# Patient Record
Sex: MALE | Race: WHITE | Employment: OTHER | URBAN - METROPOLITAN AREA
[De-identification: names, ages, dates, MRNs, and addresses within clinical notes are randomized per-mention and may not be internally consistent; named-entity substitution may affect disease eponyms.]

---

## 2019-03-12 ENCOUNTER — HOSPITAL ENCOUNTER (OUTPATIENT)
Dept: VASCULAR SURGERY | Age: 79
Discharge: HOME | End: 2019-03-12
Attending: FAMILY MEDICINE
Payer: MEDICARE

## 2019-03-12 DIAGNOSIS — I73.9 PAD (PERIPHERAL ARTERY DISEASE) (HCC): ICD-10-CM

## 2019-03-12 LAB
LEFT ABI: 1.45
LEFT ANTERIOR TIBIAL: 188 MMHG
LEFT ARM BP: 141 MMHG
LEFT CALF PRESSURE: 213 MMHG
LEFT POSTERIOR TIBIAL: 212 MMHG
RIGHT ABI: 1.36
RIGHT ANTERIOR TIBIAL: 196 MMHG
RIGHT ARM BP: 146 MMHG
RIGHT CALF PRESSURE: 202 MMHG
RIGHT POSTERIOR TIBIAL: 198 MMHG

## 2019-03-12 PROCEDURE — 93923 UPR/LXTR ART STDY 3+ LVLS: CPT

## 2020-01-11 ENCOUNTER — HOSPITAL ENCOUNTER (EMERGENCY)
Age: 80
Discharge: HOME | End: 2020-01-12
Attending: EMERGENCY MEDICINE
Payer: MEDICARE

## 2020-01-11 ENCOUNTER — APPOINTMENT (OUTPATIENT)
Dept: GENERAL RADIOLOGY | Age: 80
End: 2020-01-11
Attending: EMERGENCY MEDICINE
Payer: MEDICARE

## 2020-01-11 VITALS
HEIGHT: 71 IN | BODY MASS INDEX: 30.1 KG/M2 | OXYGEN SATURATION: 96 % | TEMPERATURE: 98.7 F | SYSTOLIC BLOOD PRESSURE: 131 MMHG | DIASTOLIC BLOOD PRESSURE: 71 MMHG | WEIGHT: 215 LBS | HEART RATE: 81 BPM | RESPIRATION RATE: 13 BRPM

## 2020-01-11 DIAGNOSIS — R53.83 FATIGUE, UNSPECIFIED TYPE: Primary | ICD-10-CM

## 2020-01-11 LAB
ALBUMIN SERPL-MCNC: 2.9 G/DL (ref 3.4–5)
ALBUMIN/GLOB SERPL: 0.9 {RATIO} (ref 0.8–1.7)
ALP SERPL-CCNC: 50 U/L (ref 45–117)
ALT SERPL-CCNC: 24 U/L (ref 16–61)
ANION GAP SERPL CALC-SCNC: 7 MMOL/L (ref 3–18)
APPEARANCE UR: CLEAR
AST SERPL-CCNC: 14 U/L (ref 10–38)
BASOPHILS # BLD: 0 K/UL (ref 0–0.1)
BASOPHILS NFR BLD: 0 % (ref 0–2)
BILIRUB SERPL-MCNC: 0.4 MG/DL (ref 0.2–1)
BILIRUB UR QL: NEGATIVE
BUN SERPL-MCNC: 25 MG/DL (ref 7–18)
BUN/CREAT SERPL: 25 (ref 12–20)
CALCIUM SERPL-MCNC: 8.6 MG/DL (ref 8.5–10.1)
CHLORIDE SERPL-SCNC: 106 MMOL/L (ref 100–111)
CK MB CFR SERPL CALC: ABNORMAL % (ref 0–4)
CK MB SERPL-MCNC: <1 NG/ML (ref 5–25)
CK SERPL-CCNC: 38 U/L (ref 39–308)
CO2 SERPL-SCNC: 28 MMOL/L (ref 21–32)
COLOR UR: YELLOW
CREAT SERPL-MCNC: 1.02 MG/DL (ref 0.6–1.3)
DIFFERENTIAL METHOD BLD: ABNORMAL
EOSINOPHIL # BLD: 0.2 K/UL (ref 0–0.4)
EOSINOPHIL NFR BLD: 3 % (ref 0–5)
ERYTHROCYTE [DISTWIDTH] IN BLOOD BY AUTOMATED COUNT: 13.7 % (ref 11.6–14.5)
GLOBULIN SER CALC-MCNC: 3.3 G/DL (ref 2–4)
GLUCOSE SERPL-MCNC: 147 MG/DL (ref 74–99)
GLUCOSE UR STRIP.AUTO-MCNC: NEGATIVE MG/DL
HCT VFR BLD AUTO: 31.7 % (ref 36–48)
HGB BLD-MCNC: 10.3 G/DL (ref 13–16)
HGB UR QL STRIP: NEGATIVE
KETONES UR QL STRIP.AUTO: NEGATIVE MG/DL
LEUKOCYTE ESTERASE UR QL STRIP.AUTO: NEGATIVE
LYMPHOCYTES # BLD: 1.7 K/UL (ref 0.9–3.6)
LYMPHOCYTES NFR BLD: 28 % (ref 21–52)
MCH RBC QN AUTO: 29.2 PG (ref 24–34)
MCHC RBC AUTO-ENTMCNC: 32.5 G/DL (ref 31–37)
MCV RBC AUTO: 89.8 FL (ref 74–97)
MONOCYTES # BLD: 0.8 K/UL (ref 0.05–1.2)
MONOCYTES NFR BLD: 13 % (ref 3–10)
NEUTS SEG # BLD: 3.4 K/UL (ref 1.8–8)
NEUTS SEG NFR BLD: 56 % (ref 40–73)
NITRITE UR QL STRIP.AUTO: NEGATIVE
PH UR STRIP: 6 [PH] (ref 5–8)
PLATELET # BLD AUTO: 170 K/UL (ref 135–420)
PMV BLD AUTO: 10.9 FL (ref 9.2–11.8)
POTASSIUM SERPL-SCNC: 3.4 MMOL/L (ref 3.5–5.5)
PROT SERPL-MCNC: 6.2 G/DL (ref 6.4–8.2)
PROT UR STRIP-MCNC: NEGATIVE MG/DL
RBC # BLD AUTO: 3.53 M/UL (ref 4.7–5.5)
SODIUM SERPL-SCNC: 141 MMOL/L (ref 136–145)
SP GR UR REFRACTOMETRY: 1.01 (ref 1–1.03)
TROPONIN I SERPL-MCNC: 0.05 NG/ML (ref 0–0.04)
UROBILINOGEN UR QL STRIP.AUTO: 1 EU/DL (ref 0.2–1)
WBC # BLD AUTO: 6.1 K/UL (ref 4.6–13.2)

## 2020-01-11 PROCEDURE — 71045 X-RAY EXAM CHEST 1 VIEW: CPT

## 2020-01-11 PROCEDURE — 99285 EMERGENCY DEPT VISIT HI MDM: CPT

## 2020-01-11 PROCEDURE — 93005 ELECTROCARDIOGRAM TRACING: CPT

## 2020-01-11 PROCEDURE — 80053 COMPREHEN METABOLIC PANEL: CPT

## 2020-01-11 PROCEDURE — 85025 COMPLETE CBC W/AUTO DIFF WBC: CPT

## 2020-01-11 PROCEDURE — 81003 URINALYSIS AUTO W/O SCOPE: CPT

## 2020-01-11 PROCEDURE — 82550 ASSAY OF CK (CPK): CPT

## 2020-01-12 LAB
ATRIAL RATE: 75 BPM
CALCULATED P AXIS, ECG09: -12 DEGREES
CALCULATED R AXIS, ECG10: -30 DEGREES
CALCULATED T AXIS, ECG11: 6 DEGREES
DIAGNOSIS, 93000: NORMAL
P-R INTERVAL, ECG05: 196 MS
Q-T INTERVAL, ECG07: 390 MS
QRS DURATION, ECG06: 102 MS
QTC CALCULATION (BEZET), ECG08: 435 MS
VENTRICULAR RATE, ECG03: 75 BPM

## 2020-01-12 PROCEDURE — 74011250636 HC RX REV CODE- 250/636: Performed by: EMERGENCY MEDICINE

## 2020-01-12 RX ADMIN — SODIUM CHLORIDE 1000 ML: 900 INJECTION, SOLUTION INTRAVENOUS at 00:09

## 2020-01-12 NOTE — ED TRIAGE NOTES
Patient presents to the ED via EMS for evaluation on generalized weakness and tachycardia. Per medic, \"He was discharged from a hospital in [facility] yesterday. He was admitted for one night because of a nose bleed and tachycardia. Since being discharged, family has noticed he has been weak and fatigued all day and want him to be checked out. Patient states he was instructed at discharge to not take his metoprolol or aspirin. \"    Patient tom any chest pain, N/V, double vision or blurry vision

## 2020-01-12 NOTE — DISCHARGE INSTRUCTIONS

## 2020-01-12 NOTE — ED PROVIDER NOTES
EMERGENCY DEPARTMENT HISTORY AND PHYSICAL EXAM    11:43 PM      Date: 1/11/2020  Patient Name: Minh Pimentel    History of Presenting Illness     Chief Complaint   Patient presents with    Fatigue         History Provided By: Patient, Patient's Wife, Patient's Son and Patient's Daughter    Additional History (Context): Minh Pimentel is a 78 y.o. male with diabetes, hypertension and hyperlipidemia who presents with [place]. Patient recently had a nosebleed. Patient had a [employer] for couple of days. He was also diagnosed with SVT. He was discharged home went home and developed dizziness and fatigue. Patient denies chest pain. He has felt nauseated. He denies vomiting or diarrhea. He denies cough. His appetite has been normal.  He denies any urinary symptoms including blood or painful urination. He denies any smoking, alcohol or recreational drug use. PCP: Angélica Nunez MD          Past History     Past Medical History:  History reviewed. No pertinent past medical history. Past Surgical History:  History reviewed. No pertinent surgical history. Family History:  History reviewed. No pertinent family history. Social History:  Social History     Tobacco Use    Smoking status: Never Smoker    Smokeless tobacco: Never Used   Substance Use Topics    Alcohol use: Not on file    Drug use: Not on file       Allergies:  No Known Allergies      Review of Systems       Review of Systems   Constitutional: Positive for activity change. Negative for appetite change, chills, diaphoresis and fever. HENT: Negative. Negative for congestion, rhinorrhea and sore throat. Eyes: Negative. Negative for pain, discharge and redness. Respiratory: Positive for shortness of breath. Negative for cough, chest tightness and wheezing. Cardiovascular: Negative. Negative for chest pain. Gastrointestinal: Positive for nausea. Negative for abdominal pain, constipation, diarrhea and vomiting. Genitourinary: Negative. Negative for dysuria, flank pain, frequency, hematuria and urgency. Musculoskeletal: Negative. Negative for back pain and neck pain. Skin: Negative. Negative for rash. Neurological: Positive for dizziness and light-headedness. Negative for syncope, weakness, numbness and headaches. Psychiatric/Behavioral: Negative. All other systems reviewed and are negative. Physical Exam     Visit Vitals  /71   Pulse 81   Temp 98.7 °F (37.1 °C)   Resp 13   Ht 5' 11\" (1.803 m)   Wt 97.5 kg (215 lb)   SpO2 96%   BMI 29.99 kg/m²         Physical Exam  Vitals signs and nursing note reviewed. Constitutional:       General: He is not in acute distress. Appearance: Normal appearance. He is well-developed. He is not ill-appearing, toxic-appearing or diaphoretic. HENT:      Head: Normocephalic and atraumatic. Mouth/Throat:      Pharynx: No oropharyngeal exudate. Eyes:      General: No scleral icterus. Conjunctiva/sclera: Conjunctivae normal.      Pupils: Pupils are equal, round, and reactive to light. Neck:      Musculoskeletal: Normal range of motion and neck supple. Thyroid: No thyromegaly. Vascular: No hepatojugular reflux or JVD. Trachea: No tracheal deviation. Cardiovascular:      Rate and Rhythm: Regular rhythm. Tachycardia present. Pulses: Normal pulses. Radial pulses are 2+ on the right side and 2+ on the left side. Dorsalis pedis pulses are 2+ on the right side and 2+ on the left side. Heart sounds: Normal heart sounds, S1 normal and S2 normal. No murmur. No gallop. No S3 or S4 sounds. Pulmonary:      Effort: Pulmonary effort is normal. No respiratory distress. Breath sounds: Normal breath sounds. No decreased breath sounds, wheezing, rhonchi or rales. Abdominal:      General: Bowel sounds are normal. There is no distension. Palpations: Abdomen is soft. Abdomen is not rigid. There is no mass. Tenderness: There is no tenderness. There is no guarding or rebound. Negative signs include Pierce's sign and McBurney's sign. Musculoskeletal: Normal range of motion. Comments: Strength is 4-5 throughout. Lymphadenopathy:      Head:      Right side of head: No submental, submandibular, preauricular or occipital adenopathy. Left side of head: No submental, submandibular, preauricular or occipital adenopathy. Cervical: No cervical adenopathy. Upper Body:      Right upper body: No supraclavicular adenopathy. Left upper body: No supraclavicular adenopathy. Skin:     General: Skin is warm and dry. Findings: No rash. Neurological:      Mental Status: He is alert. He is not disoriented. GCS: GCS eye subscore is 4. GCS verbal subscore is 5. GCS motor subscore is 6. Cranial Nerves: No cranial nerve deficit. Sensory: No sensory deficit. Coordination: Coordination normal.      Gait: Gait normal.      Deep Tendon Reflexes: Reflexes are normal and symmetric. Comments: Grossly intact. Psychiatric:         Speech: Speech normal.         Behavior: Behavior normal.         Thought Content:  Thought content normal.         Judgment: Judgment normal.           Diagnostic Study Results     Labs -  Recent Results (from the past 12 hour(s))   EKG, 12 LEAD, INITIAL    Collection Time: 01/11/20 10:12 PM   Result Value Ref Range    Ventricular Rate 75 BPM    Atrial Rate 75 BPM    P-R Interval 196 ms    QRS Duration 102 ms    Q-T Interval 390 ms    QTC Calculation (Bezet) 435 ms    Calculated P Axis -12 degrees    Calculated R Axis -30 degrees    Calculated T Axis 6 degrees    Diagnosis       Normal sinus rhythm  Left axis deviation  Incomplete right bundle branch block  Abnormal ECG  No previous ECGs available     CARDIAC PANEL,(CK, CKMB & TROPONIN)    Collection Time: 01/11/20 10:30 PM   Result Value Ref Range    CK 38 (L) 39 - 308 U/L    CK - MB <1.0 <3.6 ng/ml    CK-MB Index  0.0 - 4.0 %     CALCULATION NOT PERFORMED WHEN RESULT IS BELOW LINEAR LIMIT    Troponin-I, QT 0.05 (H) 0.0 - 0.045 NG/ML   CBC WITH AUTOMATED DIFF    Collection Time: 01/11/20 10:30 PM   Result Value Ref Range    WBC 6.1 4.6 - 13.2 K/uL    RBC 3.53 (L) 4.70 - 5.50 M/uL    HGB 10.3 (L) 13.0 - 16.0 g/dL    HCT 31.7 (L) 36.0 - 48.0 %    MCV 89.8 74.0 - 97.0 FL    MCH 29.2 24.0 - 34.0 PG    MCHC 32.5 31.0 - 37.0 g/dL    RDW 13.7 11.6 - 14.5 %    PLATELET 181 255 - 965 K/uL    MPV 10.9 9.2 - 11.8 FL    NEUTROPHILS 56 40 - 73 %    LYMPHOCYTES 28 21 - 52 %    MONOCYTES 13 (H) 3 - 10 %    EOSINOPHILS 3 0 - 5 %    BASOPHILS 0 0 - 2 %    ABS. NEUTROPHILS 3.4 1.8 - 8.0 K/UL    ABS. LYMPHOCYTES 1.7 0.9 - 3.6 K/UL    ABS. MONOCYTES 0.8 0.05 - 1.2 K/UL    ABS. EOSINOPHILS 0.2 0.0 - 0.4 K/UL    ABS. BASOPHILS 0.0 0.0 - 0.1 K/UL    DF AUTOMATED     METABOLIC PANEL, COMPREHENSIVE    Collection Time: 01/11/20 10:30 PM   Result Value Ref Range    Sodium 141 136 - 145 mmol/L    Potassium 3.4 (L) 3.5 - 5.5 mmol/L    Chloride 106 100 - 111 mmol/L    CO2 28 21 - 32 mmol/L    Anion gap 7 3.0 - 18 mmol/L    Glucose 147 (H) 74 - 99 mg/dL    BUN 25 (H) 7.0 - 18 MG/DL    Creatinine 1.02 0.6 - 1.3 MG/DL    BUN/Creatinine ratio 25 (H) 12 - 20      GFR est AA >60 >60 ml/min/1.73m2    GFR est non-AA >60 >60 ml/min/1.73m2    Calcium 8.6 8.5 - 10.1 MG/DL    Bilirubin, total 0.4 0.2 - 1.0 MG/DL    ALT (SGPT) 24 16 - 61 U/L    AST (SGOT) 14 10 - 38 U/L    Alk.  phosphatase 50 45 - 117 U/L    Protein, total 6.2 (L) 6.4 - 8.2 g/dL    Albumin 2.9 (L) 3.4 - 5.0 g/dL    Globulin 3.3 2.0 - 4.0 g/dL    A-G Ratio 0.9 0.8 - 1.7     URINALYSIS W/ RFLX MICROSCOPIC    Collection Time: 01/11/20 11:00 PM   Result Value Ref Range    Color YELLOW      Appearance CLEAR      Specific gravity 1.011 1.005 - 1.030      pH (UA) 6.0 5.0 - 8.0      Protein NEGATIVE  NEG mg/dL    Glucose NEGATIVE  NEG mg/dL    Ketone NEGATIVE  NEG mg/dL    Bilirubin NEGATIVE  NEG Blood NEGATIVE  NEG      Urobilinogen 1.0 0.2 - 1.0 EU/dL    Nitrites NEGATIVE  NEG      Leukocyte Esterase NEGATIVE  NEG         Radiologic Studies -   XR CHEST PORT   Final Result   IMPRESSION:      Negative chest.               Medical Decision Making   Provider Notes (Medical Decision Making):  MDM  Number of Diagnoses or Management Options  Fatigue, unspecified type:   Diagnosis management comments: Shortness of breath etiologies include chronic obstructive pulmonary disease (COPD), acute asthma exacerbation, congestive heart failure, pneumonia, acute bronchitis, pulmonary embolism, upper respiratory infection, cardiac event to include acute coronary syndrome, acute myocardial infarction or a combination of the above (ex URI on top of COPD thus causing respiratory distress). Patient is an 49-year-old  male with some recent blood loss secondary to nosebleed. He is not on blood thinners. Was diagnosed with SVT. Complaining now of some fatigue. Will order cardiac enzymes, UA, x-ray and lab work. I am the first provider for this patient. I reviewed the vital signs, available nursing notes, past medical history, past surgical history, family history and social history. Vital Signs-Reviewed the patient's vital signs. Records Reviewed: Nursing Notes (Time of Review: 11:43 PM)    ED Course: Progress Notes, Reevaluation, and Consults:    Labs essentially normal with the exception of hemoglobin 10.3. Chest X-Ray showed No acute process. EKG showed NSR with rate of 75 bpm. With no ST elevations or depression and non specific T wave changes. 11:43 PM 1/11/2020    When examined patient's heart rate was in the 130s. When he sat up he self-Valsalva and heart rate went down to the 80s. He remained there the entire time. Diagnosis       I have reassessed the patient. Patient is feeling well. Patient was discharged in stable condition.   Patient is to return to emergency department if any new or worsening condition. Clinical Impression:   1. Fatigue, unspecified type        Disposition: Discharged home     Follow-up Information     Follow up With Specialties Details Why Israel Garnett., MD Family Practice In 2 days  [address]  [address] [facility] [address] [phone]               Attestation        Provider Attestation:     I personally performed the services described in the documentation, reviewed the documentation and it accurately and completely records my words and actions utilizing the [address] [place] January 12, 2020 at 12:37 AM - Elroy Vallecillo DO    Disclaimer. It is dictated using utilizing voice recognition software. Unfortunately this leads to occasional typographical errors. I apologize in advance if the situation occurs. If questions arise please do not hesitate to contact me or call our department.

## 2020-01-14 ENCOUNTER — OFFICE VISIT (OUTPATIENT)
Dept: CARDIOLOGY CLINIC | Age: 80
End: 2020-01-14

## 2020-01-14 VITALS
HEART RATE: 65 BPM | SYSTOLIC BLOOD PRESSURE: 124 MMHG | HEIGHT: 71 IN | OXYGEN SATURATION: 95 % | RESPIRATION RATE: 18 BRPM | WEIGHT: 221 LBS | DIASTOLIC BLOOD PRESSURE: 76 MMHG | BODY MASS INDEX: 30.94 KG/M2

## 2020-01-14 DIAGNOSIS — I47.1 SVT (SUPRAVENTRICULAR TACHYCARDIA) (HCC): Primary | ICD-10-CM

## 2020-01-14 RX ORDER — CEPHALEXIN 500 MG/1
500 CAPSULE ORAL
COMMUNITY
Start: 2020-01-08 | End: 2020-01-18

## 2020-01-14 RX ORDER — METOPROLOL TARTRATE 25 MG/1
12.5 TABLET, FILM COATED ORAL 2 TIMES DAILY
Qty: 1 TAB | Refills: 0
Start: 2020-01-14 | End: 2020-02-26 | Stop reason: SDUPTHER

## 2020-01-14 RX ORDER — LISINOPRIL AND HYDROCHLOROTHIAZIDE 12.5; 2 MG/1; MG/1
TABLET ORAL 2 TIMES DAILY
COMMUNITY
End: 2020-01-15 | Stop reason: ALTCHOICE

## 2020-01-14 RX ORDER — GLIMEPIRIDE 1 MG/1
1 TABLET ORAL
COMMUNITY
Start: 2019-12-02 | End: 2021-01-19

## 2020-01-14 RX ORDER — ASPIRIN 81 MG/1
TABLET ORAL DAILY
COMMUNITY
End: 2020-01-15

## 2020-01-14 RX ORDER — VERAPAMIL HYDROCHLORIDE 240 MG/1
240 TABLET, FILM COATED, EXTENDED RELEASE ORAL DAILY
Qty: 1 TAB | Refills: 0
Start: 2020-01-14 | End: 2021-01-19

## 2020-01-14 RX ORDER — VERAPAMIL HYDROCHLORIDE 240 MG/1
240 TABLET, FILM COATED, EXTENDED RELEASE ORAL
COMMUNITY
End: 2020-01-14

## 2020-01-14 RX ORDER — ATORVASTATIN CALCIUM 40 MG/1
40 TABLET, FILM COATED ORAL
COMMUNITY
Start: 2019-11-18

## 2020-01-14 RX ORDER — METOPROLOL TARTRATE 25 MG/1
25 TABLET, FILM COATED ORAL 2 TIMES DAILY
COMMUNITY
Start: 2020-01-09 | End: 2020-01-14

## 2020-01-14 NOTE — PROGRESS NOTES
Bri Ayala is a 78 y.o. male presents in office for    Chief Complaint   Patient presents with   [facility] Follow Up     [facility] 1/8/2020 r/t epistaxis; [address] 1/11/2020 r/t fatigue    Wheezing     x2 days    Cough     at night x2 days        Visit Vitals  /76 (BP 1 Location: Left arm, BP Patient Position: Sitting)   Pulse 65   Resp 18   Ht 5' 11\" (1.803 m)   Wt 100.2 kg (221 lb)   SpO2 95%   BMI 30.82 kg/m²         Health Maintenance Due   Topic Date Due    DTaP/Tdap/Td series (1 - Tdap) 02/[birth year]/1949    Shingrix Vaccine Age 50> (1 of 2) 02/07/1988    GLAUCOMA SCREENING Q2Y  02/07/2003    Pneumococcal 65+ years (1 of 1 - PPSV23) 02/07/2003    MEDICARE YEARLY EXAM  03/20/2018    Influenza Age 9 to Adult  08/01/2019             1. Have you been to the ER, urgent care clinic since your last visit? Hospitalized since your last visit? [facility] 1/9/2020 r/t Epistaxis; [address] 1/11/2020 r/t Fatigue    2. Have you seen or consulted any other health care providers outside of the [address] [place] [address] since your last visit? Include any pap smears or colon screening. No    3 most recent PHQ Screens 1/14/2020   Little interest or pleasure in doing things Not at all   Feeling down, depressed, irritable, or hopeless Not at all   Total Score PHQ 2 0       Abuse Screening Questionnaire 1/14/2020   Do you ever feel afraid of your partner? N   Are you in a relationship with someone who physically or mentally threatens you? N   Is it safe for you to go home? Y       Fall Risk Assessment, last 12 mths 1/14/2020   Able to walk? Yes   Fall in past 12 months?  No       Learning Assessment 1/14/2020   PRIMARY LEARNER Patient   HIGHEST LEVEL OF EDUCATION - PRIMARY LEARNER  > 4 YEARS OF COLLEGE   BARRIERS PRIMARY LEARNER NONE   CO-LEARNER CAREGIVER Yes   CO-LEARNER NAME Juan Nuñez Mary-Russell   PRIMARY LANGUAGE ENGLISH   LEARNER PREFERENCE PRIMARY DEMONSTRATION   ANSWERED BY patient RELATIONSHIP SELF

## 2020-01-15 ENCOUNTER — OFFICE VISIT (OUTPATIENT)
Dept: CARDIOLOGY CLINIC | Age: 80
End: 2020-01-15

## 2020-01-15 VITALS
RESPIRATION RATE: 18 BRPM | HEART RATE: 59 BPM | WEIGHT: 221 LBS | DIASTOLIC BLOOD PRESSURE: 64 MMHG | BODY MASS INDEX: 30.94 KG/M2 | SYSTOLIC BLOOD PRESSURE: 122 MMHG | HEIGHT: 71 IN | OXYGEN SATURATION: 96 %

## 2020-01-15 DIAGNOSIS — I47.1 SVT (SUPRAVENTRICULAR TACHYCARDIA) (HCC): Primary | ICD-10-CM

## 2020-01-15 RX ORDER — LISINOPRIL 20 MG/1
20 TABLET ORAL DAILY
Qty: 30 TAB | Refills: 3 | Status: SHIPPED | OUTPATIENT
Start: 2020-01-15

## 2020-01-15 NOTE — PROGRESS NOTES
Trang Ponce is a 78 y.o. male presents in office for    Chief Complaint   Patient presents with    New Patient    SVT     Diagnosed at [facility]        Visit Vitals  /64 (BP 1 Location: Left arm, BP Patient Position: Sitting)   Pulse (!) 59   Resp 18   Ht 5' 11\" (1.803 m)   Wt 100.2 kg (221 lb)   SpO2 96%   BMI 30.82 kg/m²         Health Maintenance Due   Topic Date Due    DTaP/Tdap/Td series (1 - Tdap) 02/07/1949    Shingrix Vaccine Age 50> (1 of 2) 02/07/1988    GLAUCOMA SCREENING Q2Y  02/07/2003    Pneumococcal 65+ years (1 of 1 - PPSV23) 02/07/2003    MEDICARE YEARLY EXAM  03/20/2018    Influenza Age 9 to Adult  08/01/2019             1. Have you been to the ER, urgent care clinic since your last visit? Hospitalized since your last visit? [facility] 1/8/20 r/t epistaxis; [facility] 1/11/20 r/t fatigue    2. Have you seen or consulted any other health care providers outside of the [address] [place] [address] since your last visit? Include any pap smears or colon screening. [facility]    3 most recent [address] 1/14/2020   Little interest or pleasure in doing things Not at all   Feeling down, depressed, irritable, or hopeless Not at all   Total Score PHQ 2 0       Abuse Screening Questionnaire 1/14/2020   Do you ever feel afraid of your partner? N   Are you in a relationship with someone who physically or mentally threatens you? N   Is it safe for you to go home? Y       Fall Risk Assessment, last 12 mths 1/14/2020   Able to walk? Yes   Fall in past 12 months?  No       Learning Assessment 1/14/2020   PRIMARY LEARNER Patient   HIGHEST LEVEL OF EDUCATION - PRIMARY LEARNER  > 4 YEARS OF COLLEGE   BARRIERS PRIMARY LEARNER NONE   CO-LEARNER CAREGIVER Yes   CO-LEARNER NAME Juan Nuñez Mary-Russell   PRIMARY LANGUAGE ENGLISH   LEARNER PREFERENCE PRIMARY DEMONSTRATION   ANSWERED BY patient   RELATIONSHIP SELF

## 2020-01-15 NOTE — PROGRESS NOTES
Gearline Cordial    Chief Complaint   Patient presents with    New Patient    SVT     Diagnosed at [facility]       HPI    Reynold Frias is a 78 y.o. very pleasant gentleman, here with his wife and 2 children for ER follow up of SVT. As you know, this gentleman has no previous known heart disease. Earlier this month, he had a perfuse nose bleed and was taken to the hospital while in the [facility]. His SBP was 180-200s, and found in SVT 140s. He was given Atropine x3 (per family) and didn't convert until given IV Lopressor. He has been on ACEI + HCTZ plus Verapamil for HTN control for many years. He was added Metoprolol and sent home. He saw his PCP and his BP was lower 90s so he was asked to stop BB and later that day at home went back into SVT 140s. He was then taken to [facility] ER but by the time there, HR back to normal. I personally obtained and reviewed these records. They come today with several questions. He has ENT followup, Hb last 10. He does have DM2. No exertional symptoms, no jo CP, no syncope. Past Medical History:   Diagnosis Date    SVT (supraventricular tachycardia) (HCC)        Past Surgical History:   Procedure Laterality Date    HX APPENDECTOMY      HX HERNIA REPAIR      HX TONSIL AND ADENOIDECTOMY         Current Outpatient Medications   Medication Sig Dispense Refill    lisinopril (PRINIVIL, ZESTRIL) 20 mg tablet Take 1 Tab by mouth daily. 30 Tab 3    atorvastatin (LIPITOR) 40 mg tablet Take 40 mg by mouth.  cephALEXin (KEFLEX) 500 mg capsule Take 500 mg by mouth.  glimepiride (AMARYL) 1 mg tablet Take 1 mg by mouth.  verapamil ER (CALAN-SR) 240 mg CR tablet Take 1 Tab by mouth daily. In the AM and 1/2 tab in the PM 1 Tab 0    metoprolol tartrate (LOPRESSOR) 25 mg tablet Take 0.5 Tabs by mouth two (2) times a day.  1 Tab 0       No Known Allergies    Social History     Socioeconomic History    Marital status:      Spouse name: Not on file  Number of children: Not on file    Years of education: Not on file    Highest education level: Not on file   Occupational History    Not on file   Social Needs    Financial resource strain: Not on file    Food insecurity:     Worry: Not on file     Inability: Not on file    Transportation needs:     Medical: Not on file     Non-medical: Not on file   Tobacco Use    Smoking status: Never Smoker    Smokeless tobacco: Never Used   Substance and Sexual Activity    Alcohol use: Not Currently    Drug use: Never    Sexual activity: Not Currently   Lifestyle    Physical activity:     Days per week: Not on file     Minutes per session: Not on file    Stress: Not on file   Relationships    Social connections:     Talks on phone: Not on file     Gets together: Not on file     Attends Orthodoxy service: Not on file     Active member of club or organization: Not on file     Attends meetings of clubs or organizations: Not on file     Relationship status: Not on file    Intimate partner violence:     Fear of current or ex partner: Not on file     Emotionally abused: Not on file     Physically abused: Not on file     Forced sexual activity: Not on file   Other Topics Concern    Not on file   Social History Narrative    Not on file        FH: n/a    Review of Systems    14 pt Review of Systems is negative unless otherwise mentioned in the HPI.     Wt Readings from Last 3 Encounters:   01/15/20 100.2 kg (221 lb)   01/14/20 100.2 kg (221 lb)   01/11/20 97.5 kg (215 lb)     Temp Readings from Last 3 Encounters:   01/11/20 98.7 °F (37.1 °C)     BP Readings from Last 3 Encounters:   01/15/20 122/64   01/14/20 124/76   01/11/20 131/71     Pulse Readings from Last 3 Encounters:   01/15/20 (!) 59   01/14/20 65   01/11/20 81       Physical Exam:    Visit Vitals  /64 (BP 1 Location: Left arm, BP Patient Position: Sitting)   Pulse (!) 59   Resp 18   Ht 5' 11\" (1.803 m)   Wt 100.2 kg (221 lb)   SpO2 96%   BMI 30.82 kg/m²      Physical Exam  HENT:      Head: Normocephalic and atraumatic. Eyes:      General: No scleral icterus. Pupils: Pupils are equal, round, and reactive to light. Cardiovascular:      Rate and Rhythm: Normal rate and regular rhythm. Heart sounds: Normal heart sounds. No murmur. No friction rub. No gallop. Pulmonary:      Effort: Pulmonary effort is normal. No respiratory distress. Breath sounds: Normal breath sounds. No wheezing or rales. Chest:      Chest wall: No tenderness. Abdominal:      General: Bowel sounds are normal.      Palpations: Abdomen is soft. Skin:     General: Skin is warm and dry. Findings: No rash. Neurological:      Mental Status: He is alert and oriented to person, place, and time. EKG today shows: NSR, normal axis and intervals, no ST segment abnormalities    Lab Results   Component Value Date/Time    Cholesterol, total 152 10/06/2010 02:00 PM    HDL Cholesterol 53 10/06/2010 02:00 PM    LDL, calculated 80 10/06/2010 02:00 PM    VLDL, calculated 19 10/06/2010 02:00 PM    Triglyceride 95 10/06/2010 02:00 PM    CHOL/HDL Ratio 2.9 10/06/2010 02:00 PM       Impression and Plan:  Dena Khanna is a 78 y.o. with:    1.) Paroxsymal SVT, not responsive to Atropine (likely AT)  2.) Recent acute blood loss anemia from severe epistaxis  3.) DM2    1.) Agree stop HCTZ today  2.) Lisinopril 20 mg daily  3.) Continue Verapamil as taking  4.) Continue Metoprolol Tartrate 12.5 BID for rate control  5.) SBP parameters given, ideally 100-140s, take meds after eating  6.) Not taking ASA currently, which is fine for now  7.) RTC with NP ~4-8 weeks close recheck to see if tolerating BB  8.) RTC with me 6 months    Overall likely AT, triggered by anemia/ dehydration which is responsive to low dose BB  >60 mins spent reviewing records, answering questions.   They were concerned about the +trop which is expected in SVT  Doubt ACS as he has no other exertional symptoms  Would not pursue ablation as typically not that symptomatic and its likely AT which has a lower surgical cure rate than say AVNRT  Wife also asked about anxiety, which I kindly defer to PCP but did not sound out of proportion or inappropriate    Thank you for allowing me to participate in the care of your patient, please do not hesitate to call with questions or concerns.     Kindest Regards,    Vida Suarez, DO

## 2020-02-26 ENCOUNTER — OFFICE VISIT (OUTPATIENT)
Dept: CARDIOLOGY CLINIC | Age: 80
End: 2020-02-26

## 2020-02-26 VITALS
SYSTOLIC BLOOD PRESSURE: 128 MMHG | HEIGHT: 71 IN | DIASTOLIC BLOOD PRESSURE: 72 MMHG | HEART RATE: 71 BPM | WEIGHT: 222 LBS | BODY MASS INDEX: 31.08 KG/M2 | OXYGEN SATURATION: 97 %

## 2020-02-26 DIAGNOSIS — I47.1 SVT (SUPRAVENTRICULAR TACHYCARDIA) (HCC): Primary | ICD-10-CM

## 2020-02-26 DIAGNOSIS — I10 ESSENTIAL HYPERTENSION: ICD-10-CM

## 2020-02-26 RX ORDER — METOPROLOL TARTRATE 25 MG/1
12.5 TABLET, FILM COATED ORAL 2 TIMES DAILY
Qty: 180 TAB | Refills: 3 | Status: SHIPPED | OUTPATIENT
Start: 2020-02-26 | End: 2021-01-19 | Stop reason: SDUPTHER

## 2020-02-26 NOTE — PROGRESS NOTES
Eneida Ross    Chief Complaint   Patient presents with    Follow-up       HPI    Eneida Ross is a 78 y.o. very pleasant gentleman, here with his wife and 2 children for ER follow up of SVT. As you know, this gentleman has no previous known heart disease. Earlier this month, he had a perfuse nose bleed and was taken to the hospital while in the [facility]. His SBP was 180-200s, and found in SVT 140s. He was given Atropine x3 (per family) and didn't convert until given IV Lopressor. He has been on ACEI + HCTZ plus Verapamil for HTN control for many years. He was added Metoprolol and sent home. He saw his PCP and his BP was lower 90s so he was asked to stop BB and later that day at home went back into SVT 140s. He was then taken to [facility] ER but by the time there, HR back to normal. I personally obtained and reviewed these records. They come today with several questions. He has ENT followup, Hb last 10. He does have DM2. No exertional symptoms, no jo CP, no syncope. Past Medical History:   Diagnosis Date    SVT (supraventricular tachycardia) (HCC)        Past Surgical History:   Procedure Laterality Date    HX APPENDECTOMY      HX HERNIA REPAIR      HX TONSIL AND ADENOIDECTOMY         Current Outpatient Medications   Medication Sig Dispense Refill    lisinopril (PRINIVIL, ZESTRIL) 20 mg tablet Take 1 Tab by mouth daily. 30 Tab 3    atorvastatin (LIPITOR) 40 mg tablet Take 40 mg by mouth.  glimepiride (AMARYL) 1 mg tablet Take 1 mg by mouth.  verapamil ER (CALAN-SR) 240 mg CR tablet Take 1 Tab by mouth daily. In the AM and 1/2 tab in the PM 1 Tab 0    metoprolol tartrate (LOPRESSOR) 25 mg tablet Take 0.5 Tabs by mouth two (2) times a day.  1 Tab 0       No Known Allergies    Social History     Socioeconomic History    Marital status:      Spouse name: Not on file    Number of children: Not on file    Years of education: Not on file    Highest education level: Not on file Occupational History    Not on file   Social Needs    Financial resource strain: Not on file    Food insecurity:     Worry: Not on file     Inability: Not on file    Transportation needs:     Medical: Not on file     Non-medical: Not on file   Tobacco Use    Smoking status: Never Smoker    Smokeless tobacco: Never Used   Substance and Sexual Activity    Alcohol use: Not Currently    Drug use: Never    Sexual activity: Not Currently   Lifestyle    Physical activity:     Days per week: Not on file     Minutes per session: Not on file    Stress: Not on file   Relationships    Social connections:     Talks on phone: Not on file     Gets together: Not on file     Attends Taoism service: Not on file     Active member of club or organization: Not on file     Attends meetings of clubs or organizations: Not on file     Relationship status: Not on file    Intimate partner violence:     Fear of current or ex partner: Not on file     Emotionally abused: Not on file     Physically abused: Not on file     Forced sexual activity: Not on file   Other Topics Concern    Not on file   Social History Narrative    Not on file        FH: n/a    Review of Systems    14 pt Review of Systems is negative unless otherwise mentioned in the HPI. Wt Readings from Last 3 Encounters:   02/26/20 100.7 kg (222 lb)   01/15/20 100.2 kg (221 lb)   01/14/20 100.2 kg (221 lb)     Temp Readings from Last 3 Encounters:   01/11/20 98.7 °F (37.1 °C)     BP Readings from Last 3 Encounters:   02/26/20 128/72   01/15/20 122/64   01/14/20 124/76     Pulse Readings from Last 3 Encounters:   02/26/20 71   01/15/20 (!) 59   01/14/20 65       Physical Exam:    Visit Vitals  /72 (BP 1 Location: Left arm, BP Patient Position: Sitting)   Pulse 71   Ht 5' 11\" (1.803 m)   Wt 100.7 kg (222 lb)   SpO2 97%   BMI 30.96 kg/m²      Physical Exam  HENT:      Head: Normocephalic and atraumatic. Eyes:      General: No scleral icterus.      Pupils: Pupils are equal, round, and reactive to light. Cardiovascular:      Rate and Rhythm: Normal rate and regular rhythm. Heart sounds: Normal heart sounds. No murmur. No friction rub. No gallop. Pulmonary:      Effort: Pulmonary effort is normal. No respiratory distress. Breath sounds: Normal breath sounds. No wheezing or rales. Chest:      Chest wall: No tenderness. Abdominal:      General: Bowel sounds are normal.      Palpations: Abdomen is soft. Skin:     General: Skin is warm and dry. Findings: No rash. Neurological:      Mental Status: He is alert and oriented to person, place, and time. EKG today shows: NSR, normal axis and intervals, no ST segment abnormalities    Lab Results   Component Value Date/Time    Cholesterol, total 152 10/06/2010 02:00 PM    HDL Cholesterol 53 10/06/2010 02:00 PM    LDL, calculated 80 10/06/2010 02:00 PM    VLDL, calculated 19 10/06/2010 02:00 PM    Triglyceride 95 10/06/2010 02:00 PM    CHOL/HDL Ratio 2.9 10/06/2010 02:00 PM       Impression and Plan:  Radha Hernandez is a 78 y.o. with:    1.) Paroxsymal SVT, not responsive to Atropine (likely AT)  2.) Recent acute blood loss anemia from severe epistaxis  3.) DM2    1.) Agree stop HCTZ today  2.) Lisinopril 20 mg daily  3.) Continue Verapamil as taking  4.) Continue Metoprolol Tartrate 12.5 BID for rate control  5.) SBP parameters given, ideally 100-140s, take meds after eating  6.) Not taking ASA currently, which is fine for now  7.) Tolerating above changes well, no further changes today, RTC ~ July as already scheduled, after that if doing well can go to yearly    Overall likely AT, triggered by anemia/ dehydration which is responsive to low dose BB  >60 mins spent reviewing records, answering questions.   They were concerned about the +trop which is expected in SVT  Doubt ACS as he has no other exertional symptoms  Would not pursue ablation as typically not that symptomatic and its likely AT which has a lower surgical cure rate than say AVNRT  Wife also asked about anxiety, which I kindly defer to PCP but did not sound out of proportion or inappropriate  Several additional questions regarding anemia, his complexion etc which again I asked them to dw PCP    Thank you for allowing me to participate in the care of your patient, please do not hesitate to call with questions or concerns.     Kindest Regards,    Vida Suarez, DO

## 2020-02-26 NOTE — PROGRESS NOTES
Mia Hayward presents today for   Chief Complaint   Patient presents with    Follow-up       Mia Hayward preferred language for health care discussion is english/other. Is someone accompanying this pt? yes    Is the patient using any DME equipment during [address]? no    Depression Screening:  3 most recent PHQ Screens 2/26/2020   Little interest or pleasure in doing things Not at all   Feeling down, depressed, irritable, or hopeless Not at all   Total Score PHQ 2 0       Learning Assessment:  Learning Assessment 1/14/2020   PRIMARY LEARNER Patient   HIGHEST LEVEL OF EDUCATION - PRIMARY LEARNER  > 4 YEARS Karen PRIMARY LEARNER NONE   CO-LEARNER CAREGIVER Yes   CO-LEARNER NAME Juan Nuñez Mary-Russell   PRIMARY LANGUAGE ENGLISH   LEARNER PREFERENCE PRIMARY DEMONSTRATION   ANSWERED BY patient   RELATIONSHIP SELF       Abuse Screening:  Abuse Screening Questionnaire 1/14/2020   Do you ever feel afraid of your partner? N   Are you in a relationship with someone who physically or mentally threatens you? N   Is it safe for you to go home? Y       Fall Risk  Fall Risk Assessment, last 12 mths 2/26/2020   Able to walk? Yes   Fall in past 12 months? No       Pt currently taking Anticoagulant therapy? no    Coordination of Care:  1. Have you been to the ER, urgent care clinic since your last visit? Hospitalized since your last visit? no    2. Have you seen or consulted any other health care providers outside of the [facility] since your last visit? Include any pap smears or colon screening.  no

## 2020-07-16 ENCOUNTER — OFFICE VISIT (OUTPATIENT)
Dept: CARDIOLOGY CLINIC | Age: 80
End: 2020-07-16

## 2020-07-16 VITALS
OXYGEN SATURATION: 98 % | DIASTOLIC BLOOD PRESSURE: 82 MMHG | WEIGHT: 217.8 LBS | HEART RATE: 79 BPM | BODY MASS INDEX: 30.38 KG/M2 | SYSTOLIC BLOOD PRESSURE: 140 MMHG

## 2020-07-16 DIAGNOSIS — I10 ESSENTIAL HYPERTENSION: ICD-10-CM

## 2020-07-16 DIAGNOSIS — I47.1 SVT (SUPRAVENTRICULAR TACHYCARDIA) (HCC): Primary | ICD-10-CM

## 2020-07-16 NOTE — PROGRESS NOTES
Ronnie Faye    F/u SVT    HPI    Ronnie Faye is a 78 y.o. very pleasant gentleman, here with his wife and 2 children for ER follow up of SVT. As you know, this gentleman has no previous known heart disease. Earlier this month, he had a perfuse nose bleed and was taken to the hospital while in the [facility]. His SBP was 180-200s, and found in SVT 140s. He was given Atropine x3 (per family) and didn't convert until given IV Lopressor. He has been on ACEI + HCTZ plus Verapamil for HTN control for many years. He was added Metoprolol and sent home. He saw his PCP and his BP was lower 90s so he was asked to stop BB and later that day at home went back into SVT 140s. He was then taken to [facility] ER but by the time there, HR back to normal. I personally obtained and reviewed these records. They come today with several questions. He has ENT followup, Hb last 10. He does have DM2. No exertional symptoms, no jo CP, no syncope. Daughter comes today, pulled nurse aside and gave list of questions- all noncardiac. Wants to know if he should take iron, B12 etc. Concerned about swelling in legs. Pt himself has absolutely no complaints. Past Medical History:   Diagnosis Date    SVT (supraventricular tachycardia) (HCC)        Past Surgical History:   Procedure Laterality Date    HX APPENDECTOMY      HX HERNIA REPAIR      HX TONSIL AND ADENOIDECTOMY         Current Outpatient Medications   Medication Sig Dispense Refill    ferrous sulfate (SLOW FE) 142 mg (45 mg iron) ER tablet Take  by mouth Daily (before breakfast).  metoprolol tartrate (LOPRESSOR) 25 mg tablet Take 0.5 Tabs by mouth two (2) times a day. 180 Tab 3    lisinopril (PRINIVIL, ZESTRIL) 20 mg tablet Take 1 Tab by mouth daily. 30 Tab 3    atorvastatin (LIPITOR) 40 mg tablet Take 40 mg by mouth.  glimepiride (AMARYL) 1 mg tablet Take 1 mg by mouth.  verapamil ER (CALAN-SR) 240 mg CR tablet Take 1 Tab by mouth daily.  In the AM and 1/2 tab in the PM 1 Tab 0       No Known Allergies    Social History     Socioeconomic History    Marital status:      Spouse name: Not on file    Number of children: Not on file    Years of education: Not on file    Highest education level: Not on file   Occupational History    Not on file   Social Needs    Financial resource strain: Not on file    Food insecurity     Worry: Not on file     Inability: Not on file    Transportation needs     Medical: Not on file     Non-medical: Not on file   Tobacco Use    Smoking status: Never Smoker    Smokeless tobacco: Never Used   Substance and Sexual Activity    Alcohol use: Not Currently    Drug use: Never    Sexual activity: Not Currently   Lifestyle    Physical activity     Days per week: Not on file     Minutes per session: Not on file    Stress: Not on file   Relationships    Social connections     Talks on phone: Not on file     Gets together: Not on file     Attends Baptism service: Not on file     Active member of club or organization: Not on file     Attends meetings of clubs or organizations: Not on file     Relationship status: Not on file    Intimate partner violence     Fear of current or ex partner: Not on file     Emotionally abused: Not on file     Physically abused: Not on file     Forced sexual activity: Not on file   Other Topics Concern    Not on file   Social History Narrative    Not on file        FH: n/a    Review of Systems    14 pt Review of Systems is negative unless otherwise mentioned in the HPI.     Wt Readings from Last 3 Encounters:   02/26/20 100.7 kg (222 lb)   01/15/20 100.2 kg (221 lb)   01/14/20 100.2 kg (221 lb)     Temp Readings from Last 3 Encounters:   01/11/20 98.7 °F (37.1 °C)     BP Readings from Last 3 Encounters:   02/26/20 128/72   01/15/20 122/64   01/14/20 124/76     Pulse Readings from Last 3 Encounters:   02/26/20 71   01/15/20 (!) 59   01/14/20 65       Physical Exam:    There were no vitals taken for this visit. Physical Exam  HENT:      Head: Normocephalic and atraumatic. Eyes:      General: No scleral icterus. Pupils: Pupils are equal, round, and reactive to light. Cardiovascular:      Rate and Rhythm: Normal rate and regular rhythm. Heart sounds: Normal heart sounds. No murmur. No friction rub. No gallop. Pulmonary:      Effort: Pulmonary effort is normal. No respiratory distress. Breath sounds: Normal breath sounds. No wheezing or rales. Chest:      Chest wall: No tenderness. Abdominal:      General: Bowel sounds are normal.      Palpations: Abdomen is soft. Skin:     General: Skin is warm and dry. Findings: No rash. Neurological:      Mental Status: He is alert and oriented to person, place, and time. EKG today shows: NSR, normal axis and intervals, no ST segment abnormalities    Lab Results   Component Value Date/Time    Cholesterol, total 152 10/06/2010 02:00 PM    HDL Cholesterol 53 10/06/2010 02:00 PM    LDL, calculated 80 10/06/2010 02:00 PM    VLDL, calculated 19 10/06/2010 02:00 PM    Triglyceride 95 10/06/2010 02:00 PM    CHOL/HDL Ratio 2.9 10/06/2010 02:00 PM     Lab Results   Component Value Date/Time    WBC 6.1 01/11/2020 10:30 PM    HGB 10.3 (L) 01/11/2020 10:30 PM    HCT 31.7 (L) 01/11/2020 10:30 PM    PLATELET 411 37/34/2153 10:30 PM    MCV 89.8 01/11/2020 10:30 PM     Lab Results   Component Value Date/Time    Sodium 141 01/11/2020 10:30 PM    Potassium 3.4 (L) 01/11/2020 10:30 PM    Chloride 106 01/11/2020 10:30 PM    CO2 28 01/11/2020 10:30 PM    Anion gap 7 01/11/2020 10:30 PM    Glucose 147 (H) 01/11/2020 10:30 PM    BUN 25 (H) 01/11/2020 10:30 PM    Creatinine 1.02 01/11/2020 10:30 PM    BUN/Creatinine ratio 25 (H) 01/11/2020 10:30 PM    GFR est AA >60 01/11/2020 10:30 PM    GFR est non-AA >60 01/11/2020 10:30 PM    Calcium 8.6 01/11/2020 10:30 PM    Bilirubin, total 0.4 01/11/2020 10:30 PM    Alk.  phosphatase 50 01/11/2020 10:30 PM Protein, total 6.2 (L) 01/11/2020 10:30 PM    Albumin 2.9 (L) 01/11/2020 10:30 PM    Globulin 3.3 01/11/2020 10:30 PM    A-G Ratio 0.9 01/11/2020 10:30 PM    ALT (SGPT) 24 01/11/2020 10:30 PM    AST (SGOT) 14 01/11/2020 10:30 PM         Impression and Plan:  Josefina Noland is a 78 y.o. with:    1.) Paroxsymal SVT, not responsive to Atropine (likely AT)  2.) Recent acute blood loss anemia from severe epistaxis  3.) DM2  4.) Iron def anemia    1.) stopped HCTZ last time  2.) Lisinopril 20 mg daily  3.) Continue Verapamil as taking  4.) Continue Metoprolol Tartrate 12.5 BID for rate control  5.) SBP parameters given, ideally 100-140s, take meds after eating  6.) Not taking ASA currently, which is fine for now  7.) Tolerating above changes well, no further changes today, RTC ~ July as already scheduled, after that if doing well can go to yearly    Overall likely AT, triggered by anemia/ dehydration which is responsive to low dose BB  >60 mins spent reviewing records, answering questions. They were concerned about the +trop which is expected in SVT  Doubt ACS as he has no other exertional symptoms  Would not pursue ablation as typically not that symptomatic and its likely AT which has a lower surgical cure rate than say AVNRT  Wife also asked about anxiety, which I kindly defer to PCP but did not sound out of proportion or inappropriate  Several additional questions regarding anemia, his complexion etc which again I asked them to dw PCP    Thank you for allowing me to participate in the care of your patient, please do not hesitate to call with questions or concerns.     Kindest Regards,    Jerad Forte, DO

## 2020-07-16 NOTE — Clinical Note
7/16/20 Patient: Ronnie Chamberlain YOB: 1940 Date of Visit: 7/16/2020 Mckinley Sullivan MD 
VIA Dear Mckinley Sullivan MD, Thank you for referring Mr. Ronnie Chamberlain to [address] [place] [address] for evaluation. My notes for this consultation are attached. If you have questions, please do not hesitate to call me. I look forward to following your patient along with you. Sincerely, Ash Elliott, DO

## 2021-01-11 NOTE — PROGRESS NOTES
Yajaira Hull presents today for a 6 month check-up. He was last seen by Dr. Yocasta Dalal on 7/16/20 and during that visit, he appeared to be doing well. He is an 78year old male with history of hypertension, diabetes, SVT, and iron deficiency anemia. He was hospitalized in Jan. 2020, after presenting with epistaxis and was noted to be hypertensive and in SVT with heart rate in the 140's. The SVT responded to use of a beta blocker. He states that he has been feeling okay, a little tired and sleeping a little more. He states that they have noticed low heart rate readings on his blood pressure monitor with heart rates in the 40's and 50's at times. Denies chest pain, tightness, heaviness, and palpitations. Denies shortness of breath at rest, dyspnea on exertion, orthopnea and PND. Denies abdominal bloating. Denies lightheadedness, dizziness, and syncope. Admits to occasional, mild lower extremity edema and denies claudication. Denies nausea, vomiting, diarrhea, melena, hematochezia. Denies hematuria, urgency, frequency. Denies fever, chills. He has been having tooth pain and saw the oral surgeon yesterday. He is tentatively scheduled for a tooth extraction, he reports, sometime in March. PMH:  Past Medical History:   Diagnosis Date    SVT (supraventricular tachycardia) (HCC)        PSH:  Past Surgical History:   Procedure Laterality Date    HX APPENDECTOMY      HX HERNIA REPAIR      HX TONSIL AND ADENOIDECTOMY         MEDS:  Current Outpatient Medications   Medication Sig    metoprolol tartrate (LOPRESSOR) 25 mg tablet Take 0.5 Tabs by mouth two (2) times a day.  verapamil ER (CALAN-SR) 240 mg CR tablet Take 1 Tab by mouth daily.  glimepiride (AMARYL) 1 mg tablet Take 1 Tab by mouth daily.  cholecalciferol, vitamin D3, 50 mcg (2,000 unit) tab Take 1 Tab by mouth daily.  multivitamin (ONE A DAY) tablet Take 1 Tab by mouth daily.     ferrous sulfate (SLOW FE) 142 mg (45 mg iron) ER tablet Take  by mouth Daily (before breakfast).  lisinopril (PRINIVIL, ZESTRIL) 20 mg tablet Take 1 Tab by mouth daily.  atorvastatin (LIPITOR) 40 mg tablet Take 40 mg by mouth. No current facility-administered medications for this visit. Allergies and Sensitivities:  No Known Allergies    Family History:  Family History   Problem Relation Age of Onset    Heart Failure Mother     Diabetes Mother     Cancer Father         Esophageal Cancer       Social History:  He  reports that he has never smoked. He has never used smokeless tobacco.  He  reports previous alcohol use. Physical:  Visit Vitals  BP (!) 170/84 (BP 1 Location: Left arm, BP Patient Position: Sitting)   Pulse (!) 50   Ht 5' 11\" (1.803 m)   Wt 98.5 kg (217 lb 3.2 oz)   SpO2 98%   BMI 30.29 kg/m²         Exam:  Neck:  Supple, no JVD, no carotid bruits  CV:  Normal S1 and  S2, no murmurs, rubs, or gallops noted  Lungs:  Clear to ausculation throughout, no wheezes or rales  Abd:  Soft, non-tender, non-distended with good bowel sounds. No hepatosplenomegaly  Extremities:  Trace lower extremity edema      Data:  EKG:      LABS:  Lab Results   Component Value Date/Time    Sodium 141 01/11/2020 10:30 PM    Potassium 3.4 (L) 01/11/2020 10:30 PM    Chloride 106 01/11/2020 10:30 PM    CO2 28 01/11/2020 10:30 PM    Glucose 147 (H) 01/11/2020 10:30 PM    BUN 25 (H) 01/11/2020 10:30 PM    Creatinine 1.02 01/11/2020 10:30 PM     Lab Results   Component Value Date/Time    Cholesterol, total 152 10/06/2010 02:00 PM    HDL Cholesterol 53 10/06/2010 02:00 PM    LDL, calculated 80 10/06/2010 02:00 PM    Triglyceride 95 10/06/2010 02:00 PM    CHOL/HDL Ratio 2.9 10/06/2010 02:00 PM     Lab Results   Component Value Date/Time    ALT (SGPT) 24 01/11/2020 10:30 PM         Impression/Plan:  1. Essential hypertension, blood pressure elevated and suboptimally controlled  2. History of SVT, stable on beta blocker  3.   Iron deficiency anemia, on iron supplementation  4. Diabetes mellitus, recommend Hgb A1c less than 7% from cardiac standpoint  5. Bradycardia, heart rate 50 bpm today  6. Fatigue/increased somnolence, possibly due to bradycardia    Mr. Billy was seen today for a 6 month check-up. He states that he has been feeling okay but has noticed a little more fatigue and taking more naps. He also has noticed some lower heart rate readings lately. They have noticed heart rates in the 40's and 50's at times. He denies palpitations, lightheadedness, dizziness, pre-syncope, and syncope. I've requested that he wear an event monitor for 7 days to evaluate his overall heart rate. Will discontinue his metoprolol for now. His blood pressure is quite elevated today. He reports having problems with a tooth and tooth pain as well. He took antibiotics. He states that he saw the oral surgeon yesterday and a tooth extraction is tentatively planned for sometime in March. He will be started on hydralazine 10mg twice a day for his blood pressure and he will monitor his blood pressures at home. He has been scheduled to see Dr. Alexandra Boothe in one month for follow-up and for clearance for the tooth extraction. Koby Chávez MSN, FNP-BC    Please note:  Portions of this chart were created with Dragon medical speech to text program.  Unrecognized errors may be present.

## 2021-01-19 ENCOUNTER — OFFICE VISIT (OUTPATIENT)
Dept: CARDIOLOGY CLINIC | Age: 81
End: 2021-01-19
Payer: MEDICARE

## 2021-01-19 VITALS
WEIGHT: 217.2 LBS | DIASTOLIC BLOOD PRESSURE: 80 MMHG | HEIGHT: 71 IN | SYSTOLIC BLOOD PRESSURE: 170 MMHG | OXYGEN SATURATION: 98 % | HEART RATE: 50 BPM | BODY MASS INDEX: 30.41 KG/M2

## 2021-01-19 DIAGNOSIS — R00.1 BRADYCARDIA: ICD-10-CM

## 2021-01-19 DIAGNOSIS — I10 ESSENTIAL HYPERTENSION: ICD-10-CM

## 2021-01-19 DIAGNOSIS — I47.1 SVT (SUPRAVENTRICULAR TACHYCARDIA) (HCC): Primary | ICD-10-CM

## 2021-01-19 DIAGNOSIS — I47.1 SVT (SUPRAVENTRICULAR TACHYCARDIA) (HCC): ICD-10-CM

## 2021-01-19 PROCEDURE — G8427 DOCREV CUR MEDS BY ELIG CLIN: HCPCS | Performed by: NURSE PRACTITIONER

## 2021-01-19 PROCEDURE — G8432 DEP SCR NOT DOC, RNG: HCPCS | Performed by: NURSE PRACTITIONER

## 2021-01-19 PROCEDURE — G8536 NO DOC ELDER MAL SCRN: HCPCS | Performed by: NURSE PRACTITIONER

## 2021-01-19 PROCEDURE — 1101F PT FALLS ASSESS-DOCD LE1/YR: CPT | Performed by: NURSE PRACTITIONER

## 2021-01-19 PROCEDURE — G8417 CALC BMI ABV UP PARAM F/U: HCPCS | Performed by: NURSE PRACTITIONER

## 2021-01-19 PROCEDURE — G8753 SYS BP > OR = 140: HCPCS | Performed by: NURSE PRACTITIONER

## 2021-01-19 PROCEDURE — 99213 OFFICE O/P EST LOW 20 MIN: CPT | Performed by: NURSE PRACTITIONER

## 2021-01-19 PROCEDURE — G8754 DIAS BP LESS 90: HCPCS | Performed by: NURSE PRACTITIONER

## 2021-01-19 RX ORDER — METOPROLOL TARTRATE 25 MG/1
12.5 TABLET, FILM COATED ORAL 2 TIMES DAILY
Qty: 90 TAB | Refills: 3 | Status: SHIPPED | OUTPATIENT
Start: 2021-01-19 | End: 2021-07-30 | Stop reason: SDUPTHER

## 2021-01-19 RX ORDER — VERAPAMIL HYDROCHLORIDE 240 MG/1
240 TABLET, FILM COATED, EXTENDED RELEASE ORAL DAILY
Qty: 1 TAB | Refills: 0
Start: 2021-01-19

## 2021-01-19 RX ORDER — BISMUTH SUBSALICYLATE 262 MG
1 TABLET,CHEWABLE ORAL DAILY
COMMUNITY

## 2021-01-19 RX ORDER — CHOLECALCIFEROL (VITAMIN D3) 125 MCG
CAPSULE ORAL
COMMUNITY
End: 2021-01-19

## 2021-01-19 RX ORDER — CHOLECALCIFEROL (VITAMIN D3) 125 MCG
1 CAPSULE ORAL DAILY
Qty: 1 TAB | Refills: 0
Start: 2021-01-19

## 2021-01-19 RX ORDER — HYDRALAZINE HYDROCHLORIDE 10 MG/1
10 TABLET, FILM COATED ORAL 2 TIMES DAILY
Qty: 60 TAB | Refills: 4 | Status: SHIPPED | OUTPATIENT
Start: 2021-01-19 | End: 2021-04-19

## 2021-01-19 RX ORDER — GLIMEPIRIDE 1 MG/1
1 TABLET ORAL DAILY
Qty: 1 TAB | Refills: 0
Start: 2021-01-19

## 2021-01-19 NOTE — PROGRESS NOTES
Ryder Najera presents today for   Chief Complaint   Patient presents with    Follow-up     6 month        Ryder Najera preferred language for health care discussion is english/other. Is someone accompanying this pt? no    Is the patient using any DME equipment during [address]? no    Depression Screening:  3 most recent PHQ Screens 1/19/2021   Little interest or pleasure in doing things Not at all   Feeling down, depressed, irritable, or hopeless Not at all   Total Score PHQ 2 0       Learning Assessment:  Learning Assessment 1/14/2020   PRIMARY LEARNER Patient   HIGHEST LEVEL OF EDUCATION - PRIMARY LEARNER  > 4 YEARS Karen PRIMARY LEARNER NONE   CO-LEARNER CAREGIVER Yes   CO-LEARNER NAME Juan Nuñez Mary-Russell   PRIMARY LANGUAGE ENGLISH   LEARNER PREFERENCE PRIMARY DEMONSTRATION   ANSWERED BY patient   RELATIONSHIP SELF       Abuse Screening:  Abuse Screening Questionnaire 1/19/2021   Do you ever feel afraid of your partner? N   Are you in a relationship with someone who physically or mentally threatens you? N   Is it safe for you to go home? Y       Fall Risk  Fall Risk Assessment, last 12 mths 1/19/2021   Able to walk? Yes   Fall in past 12 months? 0   Do you feel unsteady? 0   Are you worried about falling 0       Pt currently taking Anticoagulant therapy? no    Coordination of Care:  1. Have you been to the ER, urgent care clinic since your last visit? Hospitalized since your last visit? no    2. Have you seen or consulted any other health care providers outside of the [address] [place] [address] since your last visit? Include any pap smears or colon screening.  no

## 2021-01-19 NOTE — PATIENT INSTRUCTIONS
Begin hydralazine 10mg twice a day (for blood pressure)  Discontinue metoprolol  Event monitor to be worn for 7 days ;  Dx:  Bradycardia, hx of SVT  Follow-up with Dr. Alexandra Boothe as scheduled and as needed

## 2021-02-18 ENCOUNTER — OFFICE VISIT (OUTPATIENT)
Dept: CARDIOLOGY CLINIC | Age: 81
End: 2021-02-18
Payer: MEDICARE

## 2021-02-18 VITALS
HEART RATE: 63 BPM | BODY MASS INDEX: 29.96 KG/M2 | OXYGEN SATURATION: 96 % | DIASTOLIC BLOOD PRESSURE: 80 MMHG | SYSTOLIC BLOOD PRESSURE: 128 MMHG | HEIGHT: 71 IN | WEIGHT: 214 LBS

## 2021-02-18 DIAGNOSIS — I47.1 SVT (SUPRAVENTRICULAR TACHYCARDIA) (HCC): ICD-10-CM

## 2021-02-18 DIAGNOSIS — R00.1 BRADYCARDIA: ICD-10-CM

## 2021-02-18 DIAGNOSIS — I10 ESSENTIAL HYPERTENSION: ICD-10-CM

## 2021-02-18 DIAGNOSIS — Z01.810 PREOP CARDIOVASCULAR EXAM: Primary | ICD-10-CM

## 2021-02-18 PROCEDURE — G8427 DOCREV CUR MEDS BY ELIG CLIN: HCPCS | Performed by: INTERNAL MEDICINE

## 2021-02-18 PROCEDURE — G8417 CALC BMI ABV UP PARAM F/U: HCPCS | Performed by: INTERNAL MEDICINE

## 2021-02-18 PROCEDURE — G8752 SYS BP LESS 140: HCPCS | Performed by: INTERNAL MEDICINE

## 2021-02-18 PROCEDURE — G8754 DIAS BP LESS 90: HCPCS | Performed by: INTERNAL MEDICINE

## 2021-02-18 PROCEDURE — G8536 NO DOC ELDER MAL SCRN: HCPCS | Performed by: INTERNAL MEDICINE

## 2021-02-18 PROCEDURE — G8432 DEP SCR NOT DOC, RNG: HCPCS | Performed by: INTERNAL MEDICINE

## 2021-02-18 PROCEDURE — 99214 OFFICE O/P EST MOD 30 MIN: CPT | Performed by: INTERNAL MEDICINE

## 2021-02-18 PROCEDURE — 1101F PT FALLS ASSESS-DOCD LE1/YR: CPT | Performed by: INTERNAL MEDICINE

## 2021-02-18 NOTE — PROGRESS NOTES
Ryder Najera presents today for   Chief Complaint   Patient presents with    Follow-up     6 month follow up        Ryder Najera preferred language for health care discussion is english/other. Is someone accompanying this pt? no    Is the patient using any DME equipment during [address]? no    Depression Screening:  3 most recent PHQ Screens 1/19/2021   Little interest or pleasure in doing things Not at all   Feeling down, depressed, irritable, or hopeless Not at all   Total Score PHQ 2 0       Learning Assessment:  Learning Assessment 1/14/2020   PRIMARY LEARNER Patient   HIGHEST LEVEL OF EDUCATION - PRIMARY LEARNER  > 4 YEARS Karen PRIMARY LEARNER NONE   CO-LEARNER CAREGIVER Yes   CO-LEARNER NAME Juan Nuñez Mary-Russell   PRIMARY LANGUAGE ENGLISH   LEARNER PREFERENCE PRIMARY DEMONSTRATION   ANSWERED BY patient   RELATIONSHIP SELF       Abuse Screening:  Abuse Screening Questionnaire 1/19/2021   Do you ever feel afraid of your partner? N   Are you in a relationship with someone who physically or mentally threatens you? N   Is it safe for you to go home? Y       Fall Risk  Fall Risk Assessment, last 12 mths 1/19/2021   Able to walk? Yes   Fall in past 12 months? 0   Do you feel unsteady? 0   Are you worried about falling 0       Pt currently taking Anticoagulant therapy? no    Coordination of Care:  1. Have you been to the ER, urgent care clinic since your last visit? Hospitalized since your last visit? no    2. Have you seen or consulted any other health care providers outside of the [address] [place] [address] since your last visit? Include any pap smears or colon screening.  no

## 2021-02-18 NOTE — PROGRESS NOTES
Yessica Hodges    preop    HPI    Yessica Hodges is a 78 y.o. very pleasant gentleman, here with his wife and 2 children for ER follow up of SVT. As you know, this gentleman has no previous known heart disease. Earlier this month, he had a perfuse nose bleed and was taken to the hospital while in the [facility]. His SBP was 180-200s, and found in SVT 140s. He was given Atropine x3 (per family) and didn't convert until given IV Lopressor. He has been on ACEI + HCTZ plus Verapamil for HTN control for many years. He was added Metoprolol and sent home. He saw his PCP and his BP was lower 90s so he was asked to stop BB and later that day at home went back into SVT 140s. He was then taken to [facility] ER but by the time there, HR back to normal. I personally obtained and reviewed these records. They come today with several questions. He has ENT followup, Hb last 10. He does have DM2. No exertional symptoms, no jo CP, no syncope. Daughter comes today, pulled nurse aside and gave list of questions- all noncardiac. Wants to know if he should take iron, B12 etc. Concerned about swelling in legs. Pt himself has absolutely no complaints. Past Medical History:   Diagnosis Date    SVT (supraventricular tachycardia) (HCC)        Past Surgical History:   Procedure Laterality Date    HX APPENDECTOMY      HX HERNIA REPAIR      HX TONSIL AND ADENOIDECTOMY         Current Outpatient Medications   Medication Sig Dispense Refill    multivitamin (ONE A DAY) tablet Take 1 Tab by mouth daily.  metoprolol tartrate (LOPRESSOR) 25 mg tablet Take 0.5 Tabs by mouth two (2) times a day. 90 Tab 3    verapamil ER (CALAN-SR) 240 mg CR tablet Take 1 Tab by mouth daily. 1 Tab 0    glimepiride (AMARYL) 1 mg tablet Take 1 Tab by mouth daily. 1 Tab 0    cholecalciferol, vitamin D3, 50 mcg (2,000 unit) tab Take 1 Tab by mouth daily. 1 Tab 0    hydrALAZINE (APRESOLINE) 10 mg tablet Take 1 Tab by mouth two (2) times a day.  60 Tab 4    ferrous sulfate (SLOW FE) 142 mg (45 mg iron) ER tablet Take  by mouth Daily (before breakfast).  lisinopril (PRINIVIL, ZESTRIL) 20 mg tablet Take 1 Tab by mouth daily. 30 Tab 3    atorvastatin (LIPITOR) 40 mg tablet Take 40 mg by mouth. No Known Allergies    Social History     Socioeconomic History    Marital status:      Spouse name: Not on file    Number of children: Not on file    Years of education: Not on file    Highest education level: Not on file   Occupational History    Not on file   Social Needs    Financial resource strain: Not on file    Food insecurity     Worry: Not on file     Inability: Not on file    Transportation needs     Medical: Not on file     Non-medical: Not on file   Tobacco Use    Smoking status: Never Smoker    Smokeless tobacco: Never Used   Substance and Sexual Activity    Alcohol use: Not Currently    Drug use: Never    Sexual activity: Not Currently   Lifestyle    Physical activity     Days per week: Not on file     Minutes per session: Not on file    Stress: Not on file   Relationships    Social connections     Talks on phone: Not on file     Gets together: Not on file     Attends [employer] service: Not on file     Active member of club or organization: Not on file     Attends meetings of clubs or organizations: Not on file     Relationship status: Not on file    Intimate partner violence     Fear of current or ex partner: Not on file     Emotionally abused: Not on file     Physically abused: Not on file     Forced sexual activity: Not on file   Other Topics Concern    Not on file   Social History Narrative    Not on file        FH: n/a    Review of Systems    14 pt Review of Systems is negative unless otherwise mentioned in the HPI.     Wt Readings from Last 3 Encounters:   02/18/21 97.1 kg (214 lb)   01/19/21 98.5 kg (217 lb 3.2 oz)   07/16/20 98.8 kg (217 lb 12.8 oz)     Temp Readings from Last 3 Encounters:   01/11/20 98.7 °F (37.1 °C)     BP Readings from Last 3 Encounters:   02/18/21 128/80   01/19/21 (!) 170/80   07/16/20 140/82     Pulse Readings from Last 3 Encounters:   02/18/21 63   01/19/21 (!) 50   07/16/20 79       Physical Exam:    Visit Vitals  /80 (BP 1 Location: Left upper arm, BP Patient Position: Sitting, BP Cuff Size: Adult)   Pulse 63   Ht 5' 11\" (1.803 m)   Wt 97.1 kg (214 lb)   SpO2 96%   BMI 29.85 kg/m²      Physical Exam  HENT:      Head: Normocephalic and atraumatic. Eyes:      General: No scleral icterus. Pupils: Pupils are equal, round, and reactive to light. Cardiovascular:      Rate and Rhythm: Normal rate and regular rhythm. Heart sounds: Normal heart sounds. No murmur. No friction rub. No gallop. Pulmonary:      Effort: Pulmonary effort is normal. No respiratory distress. Breath sounds: Normal breath sounds. No wheezing or rales. Chest:      Chest wall: No tenderness. Abdominal:      General: Bowel sounds are normal.      Palpations: Abdomen is soft. Skin:     General: Skin is warm and dry. Findings: No rash. Neurological:      Mental Status: He is alert and oriented to person, place, and time.          EKG today shows: NSR, normal axis and intervals, no ST segment abnormalities    Lab Results   Component Value Date/Time    Cholesterol, total 152 10/06/2010 02:00 PM    HDL Cholesterol 53 10/06/2010 02:00 PM    LDL, calculated 80 10/06/2010 02:00 PM    VLDL, calculated 19 10/06/2010 02:00 PM    Triglyceride 95 10/06/2010 02:00 PM    CHOL/HDL Ratio 2.9 10/06/2010 02:00 PM     Lab Results   Component Value Date/Time    WBC 6.1 01/11/2020 10:30 PM    HGB 10.3 (L) 01/11/2020 10:30 PM    HCT 31.7 (L) 01/11/2020 10:30 PM    PLATELET 636 77/61/4486 10:30 PM    MCV 89.8 01/11/2020 10:30 PM     Lab Results   Component Value Date/Time    Sodium 141 01/11/2020 10:30 PM    Potassium 3.4 (L) 01/11/2020 10:30 PM    Chloride 106 01/11/2020 10:30 PM    CO2 28 01/11/2020 10:30 PM    Anion gap 7 01/11/2020 10:30 PM    Glucose 147 (H) 01/11/2020 10:30 PM    BUN 25 (H) 01/11/2020 10:30 PM    Creatinine 1.02 01/11/2020 10:30 PM    BUN/Creatinine ratio 25 (H) 01/11/2020 10:30 PM    GFR est AA >60 01/11/2020 10:30 PM    GFR est non-AA >60 01/11/2020 10:30 PM    Calcium 8.6 01/11/2020 10:30 PM    Bilirubin, total 0.4 01/11/2020 10:30 PM    Alk. phosphatase 50 01/11/2020 10:30 PM    Protein, total 6.2 (L) 01/11/2020 10:30 PM    Albumin 2.9 (L) 01/11/2020 10:30 PM    Globulin 3.3 01/11/2020 10:30 PM    A-G Ratio 0.9 01/11/2020 10:30 PM    ALT (SGPT) 24 01/11/2020 10:30 PM    AST (SGOT) 14 01/11/2020 10:30 PM         Impression and Plan:  Michoacano Munroe is a 78 y.o. with:    1.) Paroxsymal SVT, not responsive to Atropine (likely AT)  2.) Recent acute blood loss anemia from severe epistaxis  3.) DM2  4.) Iron def anemia  5.)  Perioperative risk stratification prior to tooth extraction    1.) stopped HCTZ last time  2.) Lisinopril 20 mg daily  3.) Continue Verapamil as taking  4.) Continue Metoprolol Tartrate 12.5 BID for rate control  5.) SBP parameters given, ideally 100-140s, take meds after eating  6.) Not taking ASA currently, which is fine for now  7.) Tolerating above changes well, no further changes today, RTC ~ July as already scheduled, after that if doing well can go to yearly    Overall likely AT, triggered by anemia/ dehydration which is responsive to low dose BB    Overall patient is moderate risk from a CV standpoint but can proceed without any further testing. Most of his risk really just come from comorbidities and his age. In addition he is at largest risk to have recurrence of his SVT just from potential pain or stress prior to the procedure. I think even more so continue the metoprolol to help prevent this from happening. I did review interval notes and I see that my nurse practitioner asked him to hold his beta-blocker due to some intermittent bradycardia just a few weeks ago.   It does not sound like he did this and he still taking the metoprolol and tells me he really like to continue taking it. His heart rate is normal today he has [place] no complaints which she almost never does the told him it is okay to go ahead and continue. He has any further questions or needs refills or can go home and check but who accompanies him today was not sure. Thank you for allowing me to participate in the care of your patient, please do not hesitate to call with questions or concerns.     Kindest Regards,    Carolin Hurd, DO

## 2021-02-18 NOTE — LETTER
2/18/2021 2:48 PM 
 
Mr. Crystal Meng [address] [id number] Crystal Meng was seen in our office on 2/18/2021 for cardiac evaluation. From a cardiac standpoint he is moderate risk for Tooth extraction, may proceed without further testing has history of svt which he is on mediations for. Please feel free to contact our office if you have any questions regarding this patient. Sincerely, Jairon Rush, DO

## 2021-07-30 ENCOUNTER — OFFICE VISIT (OUTPATIENT)
Dept: CARDIOLOGY CLINIC | Age: 81
End: 2021-07-30
Payer: MEDICARE

## 2021-07-30 VITALS
WEIGHT: 213 LBS | HEART RATE: 56 BPM | BODY MASS INDEX: 29.71 KG/M2 | SYSTOLIC BLOOD PRESSURE: 134 MMHG | DIASTOLIC BLOOD PRESSURE: 82 MMHG | OXYGEN SATURATION: 96 %

## 2021-07-30 DIAGNOSIS — I47.1 SVT (SUPRAVENTRICULAR TACHYCARDIA) (HCC): Primary | ICD-10-CM

## 2021-07-30 PROCEDURE — G8754 DIAS BP LESS 90: HCPCS | Performed by: INTERNAL MEDICINE

## 2021-07-30 PROCEDURE — 1101F PT FALLS ASSESS-DOCD LE1/YR: CPT | Performed by: INTERNAL MEDICINE

## 2021-07-30 PROCEDURE — G8536 NO DOC ELDER MAL SCRN: HCPCS | Performed by: INTERNAL MEDICINE

## 2021-07-30 PROCEDURE — G8427 DOCREV CUR MEDS BY ELIG CLIN: HCPCS | Performed by: INTERNAL MEDICINE

## 2021-07-30 PROCEDURE — G8752 SYS BP LESS 140: HCPCS | Performed by: INTERNAL MEDICINE

## 2021-07-30 PROCEDURE — G8417 CALC BMI ABV UP PARAM F/U: HCPCS | Performed by: INTERNAL MEDICINE

## 2021-07-30 PROCEDURE — 93000 ELECTROCARDIOGRAM COMPLETE: CPT | Performed by: INTERNAL MEDICINE

## 2021-07-30 PROCEDURE — 99215 OFFICE O/P EST HI 40 MIN: CPT | Performed by: INTERNAL MEDICINE

## 2021-07-30 PROCEDURE — G8432 DEP SCR NOT DOC, RNG: HCPCS | Performed by: INTERNAL MEDICINE

## 2021-07-30 RX ORDER — METOPROLOL TARTRATE 25 MG/1
12.5 TABLET, FILM COATED ORAL 2 TIMES DAILY
Qty: 90 TABLET | Refills: 3 | Status: SHIPPED | OUTPATIENT
Start: 2021-07-30

## 2022-06-23 ENCOUNTER — TELEPHONE (OUTPATIENT)
Dept: CARDIOLOGY CLINIC | Age: 82
End: 2022-06-23

## 2022-06-23 NOTE — TELEPHONE ENCOUNTER
Pts wife called in regards he has been having some swelling in the legs and ankles. Pts wife sts he is taking the HCTZ. Asked if Pt was having  BP increase or Chest pain, dizziness. Pts wife states he isn't having any of those symptoms. Pts wife states he doesn't feel good. Asked who put him on the HCTZ per Dr Nba Del Valle note he was taken off of the HCTZ at the last visit and told to follow up with the PCP Dr Kiki Flores. Educated Pts wife on medications given by Dr Nba Del Valle metoprolol, hydralazine, verapamil and lisinopril. Pts wife asked me to repeat meds and advise them to follow up with PCP today. Pts wife verbalized understanding.

## 2022-06-29 ENCOUNTER — TELEPHONE (OUTPATIENT)
Dept: CARDIOLOGY CLINIC | Age: 82
End: 2022-06-29

## 2022-06-29 NOTE — TELEPHONE ENCOUNTER
Spoke with patient wife sts they went to PCP in regards to leg swelling. Sts they were told to make appointment with Dr Fozia Huang. Advise Pt I would S/W Dr Kate Rosales. Review with Dr Fozia Huang about his heart function is normal and none of the medications that he is on are causing the swelling. If he is having problems with lesion on his legs he may need to be seen at the urgent care. Patients wife states he had blood work done by his PCP and she said he needed to F/U with cardiology. Reassured her that per his last office visit with Dr Thomas [place] are not cardiac related and he will need to see a vascular specialist. Since he has lesions on his legs an Urgent care would be able to see him. Patient wife sts he will not go and the PCP said that they are healing. Patients wife states she doesn't know what to do. Asked her to have the PCP send over labs to Dr Fozia Huang and request to have him seen by a vascular specialist.  Patient wife sts she will call the PCP tomorrow.

## 2022-07-01 ENCOUNTER — TRANSCRIBE ORDER (OUTPATIENT)
Dept: SCHEDULING | Age: 82
End: 2022-07-01

## 2022-07-01 DIAGNOSIS — R00.1 BRADYCARDIA: Primary | ICD-10-CM

## 2022-07-02 ENCOUNTER — TRANSCRIBE ORDER (OUTPATIENT)
Dept: SCHEDULING | Age: 82
End: 2022-07-02

## 2022-07-08 ENCOUNTER — TELEPHONE (OUTPATIENT)
Dept: CARDIOLOGY CLINIC | Age: 82
End: 2022-07-08

## 2022-07-08 NOTE — TELEPHONE ENCOUNTER
S/W Ravi Garber from [address] [place] [address] patient came in 7/07/22 with C/O bradycardia HR in lower 50's and 1 wk ago bilateral leg swelling. Richardson Buerger she informed him that he will need to stop the metoprolol but he will not unless Dr Lindsay Chew says so. Sts she put the patient on furosemide 20mg and it has helped with the swelling. He doesn't need a vascular study done at this time she believes he need to come off the of Metoprolol. She did get him to agree to stop taking at least one of the metoprolol. Advise Matteo Beard I would let Dr Lindsay Chew know. Spoke with Dr Lindsay Chew. Ok to have patient stop metoprolol and monitor HR. If it increases then we will put him back on it.

## 2022-07-11 NOTE — TELEPHONE ENCOUNTER
Called patient wife in regards to HR. Advise her to stop the Metoprolol and monitor the HR. If his HR increases then we will have to put him back on the medication. Wife asked which reading in the HR on the pulse ox. Educated wife on which one is O2 and pulse rate on monitor. Patients wife verbalized understanding.

## 2022-12-07 ENCOUNTER — OFFICE VISIT (OUTPATIENT)
Dept: CARDIOLOGY CLINIC | Age: 82
End: 2022-12-07
Payer: MEDICARE

## 2022-12-07 VITALS
OXYGEN SATURATION: 96 % | SYSTOLIC BLOOD PRESSURE: 138 MMHG | HEART RATE: 61 BPM | WEIGHT: 214.2 LBS | DIASTOLIC BLOOD PRESSURE: 76 MMHG | BODY MASS INDEX: 29.87 KG/M2

## 2022-12-07 DIAGNOSIS — I10 ESSENTIAL HYPERTENSION: Primary | ICD-10-CM

## 2022-12-07 DIAGNOSIS — I47.1 SVT (SUPRAVENTRICULAR TACHYCARDIA) (HCC): ICD-10-CM

## 2022-12-07 PROCEDURE — 3074F SYST BP LT 130 MM HG: CPT | Performed by: INTERNAL MEDICINE

## 2022-12-07 PROCEDURE — 99215 OFFICE O/P EST HI 40 MIN: CPT | Performed by: INTERNAL MEDICINE

## 2022-12-07 PROCEDURE — G8536 NO DOC ELDER MAL SCRN: HCPCS | Performed by: INTERNAL MEDICINE

## 2022-12-07 PROCEDURE — 93000 ELECTROCARDIOGRAM COMPLETE: CPT | Performed by: INTERNAL MEDICINE

## 2022-12-07 PROCEDURE — 3078F DIAST BP <80 MM HG: CPT | Performed by: INTERNAL MEDICINE

## 2022-12-07 PROCEDURE — G8417 CALC BMI ABV UP PARAM F/U: HCPCS | Performed by: INTERNAL MEDICINE

## 2022-12-07 PROCEDURE — 1101F PT FALLS ASSESS-DOCD LE1/YR: CPT | Performed by: INTERNAL MEDICINE

## 2022-12-07 PROCEDURE — G8432 DEP SCR NOT DOC, RNG: HCPCS | Performed by: INTERNAL MEDICINE

## 2022-12-07 PROCEDURE — G8427 DOCREV CUR MEDS BY ELIG CLIN: HCPCS | Performed by: INTERNAL MEDICINE

## 2022-12-07 PROCEDURE — 1123F ACP DISCUSS/DSCN MKR DOCD: CPT | Performed by: INTERNAL MEDICINE

## 2022-12-07 NOTE — LETTER
12/7/2022    Patient: Jemal Justice   YOB: 1940   Date of Visit: 12/7/2022     Tejinder Gutierres NP  [address] [place] [id number]  Via Fax: [phone]    Dear Tejinder Gutierres NP,      Thank you for referring Mr. Jemal Justice to [address] [place] [address] for evaluation. My notes for this consultation are attached. If you have questions, please do not hesitate to call me. I look forward to following your patient along with you.       Sincerely,    Erick Gómez, DO

## 2022-12-07 NOTE — PROGRESS NOTES
Bruna Nieves    Fatigue, SVT    HPI    Bruna Nieves is a 78 y.o. very pleasant gentleman, who I met in 2020 for SVT. He had a perfuse nose bleed and was taken to the hospital while in the [facility]. His SBP was 180-200s, and found in SVT 140s. He was given Atropine x3 (per family) and didn't convert until given IV Lopressor. He has been on ACEI + HCTZ plus Verapamil for HTN control for many years. He was added Metoprolol and sent home. He saw his PCP and his BP was lower 90s so he was asked to stop BB and later that day at home went back into SVT 140s. He was then taken to [facility] ER but by the time there, HR back to normal. I personally obtained and reviewed these records. They come today with several questions. As usual, patient himself has no complaints but his wife and son have several about him. 1.) He's excessively tired 2.) Son questioning Lisinopril because he feels this makes him tired 3.) Says HR lower, has called also in the past, but unclear if pt feels poorly at these times 4.) c/o persistent LE edema, 5.) Toe \"dead\"    Aware he mentioned above to PCP- had monitor ordered but wife says \"oh no we cant do that. \" They found it too bulky and complex so never did it. She and her son want him to MCT that is sleeker (so he can still entertain their guests coming from out of town), pt reluctant. Past Medical History:   Diagnosis Date    SVT (supraventricular tachycardia) (HCC)        Past Surgical History:   Procedure Laterality Date    HX APPENDECTOMY      HX HERNIA REPAIR      HX TONSIL AND ADENOIDECTOMY         Current Outpatient Medications   Medication Sig Dispense Refill    metoprolol tartrate (LOPRESSOR) 25 mg tablet Take 0.5 Tablets by mouth two (2) times a day. 90 Tablet 3    multivitamin (ONE A DAY) tablet Take 1 Tab by mouth daily. verapamil ER (CALAN-SR) 240 mg CR tablet Take 1 Tab by mouth daily. 1 Tab 0    glimepiride (AMARYL) 1 mg tablet Take 1 Tab by mouth daily.  1 Tab 0 lisinopril (PRINIVIL, ZESTRIL) 20 mg tablet Take 1 Tab by mouth daily. 30 Tab 3    atorvastatin (LIPITOR) 40 mg tablet Take 40 mg by mouth. hydrALAZINE (APRESOLINE) 10 mg tablet TAKE 1 TABLET BY MOUTH TWICE DAILY (Patient not taking: Reported on 12/7/2022) 180 Tab 3    cholecalciferol, vitamin D3, 50 mcg (2,000 unit) tab Take 1 Tab by mouth daily. (Patient not taking: Reported on 12/7/2022) 1 Tab 0       No Known Allergies    Social History     Socioeconomic History    Marital status:      Spouse name: Not on file    Number of children: Not on file    Years of education: Not on file    Highest education level: Not on file   Occupational History    Not on file   Tobacco Use    Smoking status: Never    Smokeless tobacco: Never   Substance and Sexual Activity    Alcohol use: Not Currently    Drug use: Never    Sexual activity: Not Currently   Other Topics Concern    Not on file   Social History Narrative    Not on file     Social Determinants of Health     Financial Resource Strain: Not on file   Food Insecurity: Not on file   Transportation Needs: Not on file   Physical Activity: Not on file   Stress: Not on file   Social Connections: Not on file   Intimate Partner Violence: Not on file   Housing Stability: Not on file        FH: n/a    Review of Systems    14 pt Review of Systems is negative unless otherwise mentioned in the HPI.     Wt Readings from Last 3 Encounters:   12/07/22 97.2 kg (214 lb 3.2 oz)   07/30/21 96.6 kg (213 lb)   02/18/21 97.1 kg (214 lb)     Temp Readings from Last 3 Encounters:   01/11/20 98.7 °F (37.1 °C)     BP Readings from Last 3 Encounters:   12/07/22 138/76   07/30/21 134/82   02/18/21 128/80     Pulse Readings from Last 3 Encounters:   12/07/22 61   07/30/21 (!) 56   02/18/21 63       Physical Exam:    Visit Vitals  /76 (BP 1 Location: Left upper arm, BP Patient Position: Sitting)   Pulse 61   Wt 97.2 kg (214 lb 3.2 oz)   SpO2 96%   BMI 29.87 kg/m²      Physical Exam  HENT:      Head: Normocephalic and atraumatic. Eyes:      General: No scleral icterus. Pupils: Pupils are equal, round, and reactive to light. Cardiovascular:      Rate and Rhythm: Normal rate and regular rhythm. Heart sounds: Normal heart sounds. No murmur heard. No friction rub. No gallop. Pulmonary:      Effort: Pulmonary effort is normal. No respiratory distress. Breath sounds: Normal breath sounds. No wheezing or rales. Chest:      Chest wall: No tenderness. Abdominal:      General: Bowel sounds are normal.      Palpations: Abdomen is soft. Musculoskeletal:      Right lower leg: Edema present. Left lower leg: Edema present. Skin:     General: Skin is warm and dry. Findings: No rash. Neurological:      Mental Status: He is alert and oriented to person, place, and time.        EKG today shows: NSR, normal axis and intervals, no ST segment abnormalities    Lab Results   Component Value Date/Time    Cholesterol, total 152 10/06/2010 02:00 PM    HDL Cholesterol 53 10/06/2010 02:00 PM    LDL, calculated 80 10/06/2010 02:00 PM    VLDL, calculated 19 10/06/2010 02:00 PM    Triglyceride 95 10/06/2010 02:00 PM    CHOL/HDL Ratio 2.9 10/06/2010 02:00 PM     Lab Results   Component Value Date/Time    WBC 6.1 01/11/2020 10:30 PM    HGB 10.3 (L) 01/11/2020 10:30 PM    HCT 31.7 (L) 01/11/2020 10:30 PM    PLATELET 937 67/20/4563 10:30 PM    MCV 89.8 01/11/2020 10:30 PM     Lab Results   Component Value Date/Time    Sodium 141 01/11/2020 10:30 PM    Potassium 3.4 (L) 01/11/2020 10:30 PM    Chloride 106 01/11/2020 10:30 PM    CO2 28 01/11/2020 10:30 PM    Anion gap 7 01/11/2020 10:30 PM    Glucose 147 (H) 01/11/2020 10:30 PM    BUN 25 (H) 01/11/2020 10:30 PM    Creatinine 1.02 01/11/2020 10:30 PM    BUN/Creatinine ratio 25 (H) 01/11/2020 10:30 PM    GFR est AA >60 01/11/2020 10:30 PM    GFR est non-AA >60 01/11/2020 10:30 PM    Calcium 8.6 01/11/2020 10:30 PM    Bilirubin, total 0.4 01/11/2020 10:30 PM    Alk. phosphatase 50 01/11/2020 10:30 PM    Protein, total 6.2 (L) 01/11/2020 10:30 PM    Albumin 2.9 (L) 01/11/2020 10:30 PM    Globulin 3.3 01/11/2020 10:30 PM    A-G Ratio 0.9 01/11/2020 10:30 PM    ALT (SGPT) 24 01/11/2020 10:30 PM    AST (SGOT) 14 01/11/2020 10:30 PM         Impression and Plan:  Hermelinda Jefferson is a 78 y. o. with:    1.) Paroxsymal SVT, not responsive to Atropine (likely AT)- keep Metoprolol on to prevent  2.) Recent acute blood loss anemia from severe epistaxis  3.) DM2  4.) Iron def anemia  5.) Mild intermittent LE edema, may be mild HFpEF   6.) HTN/ but also hypotension in the past    1.) lets hold Verapamil due to bradycardia and concern for lower BPs/ fatigue/ lightheadedness  2.) After shared decision making, we agreed on 14 day MCT and my MA personally put it on patient- soon after leaving wife called saying \"no we cant do this. And that is coming off. \" He was offered to come back this week to help put it back on and she refused. So unclear if he's going to continue to wear or not  3.) Echo for LE edema, if significant HFpEF/ elevated CVP will decide Lasix  4.) I have no recent labs, wife very frustrated that I dont- I mostly want his SCr and K but she's questioning a bill they got for his iron level? Will request from PCP  5.) Several noncardiac issues mentioned- they want a podiatry consult which I asked them to dw their PCP  6.) RTC 6 months with Sunny Cisneros, yearly with me  60 mins spent my face to face time alone,  Plan dw pt, his wife and his son    Thank you for allowing me to participate in the care of your patient, please do not hesitate to call with questions or concerns.       Kindest Regards,    Raciel Velasquez, DO

## 2023-01-09 ENCOUNTER — TELEPHONE (OUTPATIENT)
Dept: CARDIOLOGY CLINIC | Age: 83
End: 2023-01-09

## 2023-01-09 NOTE — TELEPHONE ENCOUNTER
----- Message from Demetria Helton DO sent at 1/6/2023 12:29 PM EST -----  There where no significant tachy or chasidy arrhthymias in the short time he wore the monitor  We already know he has paroxysmal SVT, but also some PACs/ PVCs  No med changes advised at this time  Thanks    ----- Message -----  From: Rebeca Kolb RN  Sent: 1/5/2023   6:35 AM EST  To: Demetria Helton DO    Per your last note : echo is on 1-10-23    \"1.) Paroxsymal SVT, not responsive to Atropine (likely AT)- keep Metoprolol on to prevent  2.) Recent acute blood loss anemia from severe epistaxis  3.) DM2  4.) Iron def anemia  5.) Mild intermittent LE edema, may be mild HFpEF   6.) HTN/ but also hypotension in the past     1.) lets hold Verapamil due to bradycardia and concern for lower BPs/ fatigue/ lightheadedness  2.) After shared decision making, we agreed on 14 day MCT and my MA personally put it on patient- soon after leaving wife called saying \"no we cant do this. And that is coming off. \" He was offered to come back this week to help put it back on and she refused. So unclear if he's going to continue to wear or not  3.) Echo for LE edema, if significant HFpEF/ elevated CVP will decide Lasix  4.) I have no recent labs, wife very frustrated that I dont- I mostly want his SCr and K but she's questioning a bill they got for his iron level?  Will request from PCP  5.) Several noncardiac issues mentioned- they want a podiatry consult which I asked them to dw their PCP  6.) RTC 6 months with Radhika Navarrete, yearly with me  60 mins spent my face to face time alone,  Plan dw pt, his wife and his son

## 2023-01-11 ENCOUNTER — TELEPHONE (OUTPATIENT)
Dept: CARDIOLOGY CLINIC | Age: 83
End: 2023-01-11

## 2023-01-11 NOTE — TELEPHONE ENCOUNTER
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 And that is coming off. \" He was offered to come back this week to help put it back on and she refused. So unclear if he's going to continue to wear or not  3.) Echo for LE edema, if significant HFpEF/ elevated CVP will decide Lasix  4.) I have no recent labs, wife very frustrated that I dont- I mostly want his SCr and K but she's questioning a bill they got for his iron level? Will request from PCP  5.) Several noncardiac issues mentioned- they want a podiatry consult which I asked them to dw their PCP  6.) RTC 6 months with Shankar Chaidez, yearly with me  60 mins spent my face to face time alone,  Plan dw pt, his wife and his son.

## 2023-01-11 NOTE — TELEPHONE ENCOUNTER
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 And that is coming off. \" He was offered to come back this week to help put it back on and she refused. So unclear if he's going to continue to wear or not  3.) Echo for LE edema, if significant HFpEF/ elevated CVP will decide Lasix  4.) I have no recent labs, wife very frustrated that I dont- I mostly want his SCr and K but she's questioning a bill they got for his iron level? Will request from PCP  5.) Several noncardiac issues mentioned- they want a podiatry consult which I asked them to dw their PCP  6.) RTC 6 months with Dorita Davis, yearly with me  60 mins spent my face to face time alone,  Plan dw pt, his wife and his son.

## 2023-03-07 ENCOUNTER — TELEPHONE (OUTPATIENT)
Age: 83
End: 2023-03-07

## 2023-03-07 NOTE — TELEPHONE ENCOUNTER
Mr. Alena Anglin went to [facility] on 03-06-23 for Stasis dermatitis of both legs, dependent edema, and narrow complex tachycardia. It is documented in discharge summery that Dr. Lorenzo Morrow wanted to have him admitted for more cardiac testing and cardiac monitoring but the patient refused. Patient's wife called the office today 03-07-23 stating that she wants to know what to do with his medications. The wife states they do not have a BP cuff to check HR or BP on. She also states that the doctors think he should increase his metoprolol to 25 mg twice a day instead of 12.5 mg twice a day. Verbal order and read back per Darrian Savage    Get a BP Cuff that gets BP and HR and keep a log, and increase Metoprolol to 25 mg twice a day. Call us if he has any issues. Has an appt with Gordo Shaffer on 04-05-23 at 1:00 PM and Dr. Ruth Beauchamp on 12-06-23 at 1:40 PM.         - Patient's wife thinks that taking 25 mg twice a day is too much. She wants to start him on 12.5 mg in the AM and 25 mg in the PM or other way around. 25 mg in the AM and 12.5 mg in the PM    Explained to the wife that if her  has any more questions or concerns to call the office. This has been fully explained to the patient, who indicates understanding.

## 2023-03-09 ENCOUNTER — CLINICAL DOCUMENTATION (OUTPATIENT)
Age: 83
End: 2023-03-09

## 2023-03-09 NOTE — PROGRESS NOTES
Verbal order and read back per Alexandria Garza DO  Increase Metoprolol Tartrate to 25 mg twice a day.    This has been fully explained to the patient, who indicates understanding.

## 2023-03-13 ENCOUNTER — TELEPHONE (OUTPATIENT)
Age: 83
End: 2023-03-13

## 2023-03-13 DIAGNOSIS — R60.9 EDEMA, UNSPECIFIED TYPE: ICD-10-CM

## 2023-03-13 DIAGNOSIS — I10 ESSENTIAL (PRIMARY) HYPERTENSION: ICD-10-CM

## 2023-03-13 DIAGNOSIS — I47.1 SUPRAVENTRICULAR TACHYCARDIA (HCC): Primary | ICD-10-CM

## 2023-03-13 NOTE — TELEPHONE ENCOUNTER
Spoke to daughter and is aware that home health will call to set up with her dad the time to come evaluate.

## 2023-03-13 NOTE — TELEPHONE ENCOUNTER
Spoke to patient daughter. She is requesting someone go to patient home due to she is primary caregiver to patients and has pneumonia and unable to bring to appointment for further evaluation. Wife of patient does not drive and patient does not drive. Patient been in ED for swelling to lower extremities and tachycardia. Patient daughter states no fluid pill was given to patient and his legs are blistered and weeping. Patient was given an antibiotic due to his legs at the ED and it is almost finished but still having the problem with his legs. Patient has been increasing the Toprol per previous telephone encounter. Patient daughter would like cardiology to order home health since she thinks the swelling is due to his heart. Verbal order and read back per [address] to order home health for evaluation.

## 2023-03-15 ENCOUNTER — SCHEDULED TELEPHONE ENCOUNTER (OUTPATIENT)
Age: 83
End: 2023-03-15
Payer: MEDICARE

## 2023-03-15 DIAGNOSIS — I47.1 SUPRAVENTRICULAR TACHYCARDIA (HCC): Primary | ICD-10-CM

## 2023-03-15 PROBLEM — E78.00 HIGH CHOLESTEROL: Status: ACTIVE | Noted: 2023-03-15

## 2023-03-15 PROBLEM — I47.10 SVT (SUPRAVENTRICULAR TACHYCARDIA): Status: ACTIVE | Noted: 2020-01-09

## 2023-03-15 PROBLEM — I73.9 PVD (PERIPHERAL VASCULAR DISEASE) (HCC): Status: ACTIVE | Noted: 2020-01-09

## 2023-03-15 PROBLEM — I87.2 STASIS DERMATITIS OF BOTH LEGS: Status: ACTIVE | Noted: 2023-03-06

## 2023-03-15 PROBLEM — E11.9 TYPE 2 DIABETES MELLITUS WITHOUT COMPLICATION, WITHOUT LONG-TERM CURRENT USE OF INSULIN (HCC): Status: ACTIVE | Noted: 2020-01-09

## 2023-03-15 PROBLEM — I10 ESSENTIAL HYPERTENSION: Status: ACTIVE | Noted: 2020-01-09

## 2023-03-15 PROBLEM — R04.0 EPISTAXIS: Status: ACTIVE | Noted: 2020-01-09

## 2023-03-15 PROCEDURE — 99215 OFFICE O/P EST HI 40 MIN: CPT | Performed by: INTERNAL MEDICINE

## 2023-03-15 PROCEDURE — 1123F ACP DISCUSS/DSCN MKR DOCD: CPT | Performed by: INTERNAL MEDICINE

## 2023-03-15 ASSESSMENT — PATIENT HEALTH QUESTIONNAIRE - PHQ9
SUM OF ALL RESPONSES TO PHQ QUESTIONS 1-9: 0
SUM OF ALL RESPONSES TO PHQ9 QUESTIONS 1 & 2: 0
SUM OF ALL RESPONSES TO PHQ QUESTIONS 1-9: 0
2. FEELING DOWN, DEPRESSED OR HOPELESS: 0
SUM OF ALL RESPONSES TO PHQ QUESTIONS 1-9: 0
1. LITTLE INTEREST OR PLEASURE IN DOING THINGS: 0
SUM OF ALL RESPONSES TO PHQ QUESTIONS 1-9: 0

## 2023-03-15 NOTE — PROGRESS NOTES
Anish Cid is a 78 y.o. male evaluated via telephone on 3/15/2023 for Follow-up (ADD ON - VIRTURAL VISIT for [address])  Post ER    Assessment & Plan   SVT  Cellulitis/ Stasis dermatitis  DM  Anemia  HTN    Subjective     HPI  Pts wife has called our office frequently, post ER for \"holocaust legs\"  Records obtained and reviewed, he had SVT resolved with IV Lopressor  Recommended hospitalization, which they document he declines but family does not agree  Its difficult for them to come to appts/ he does not want a lot of tests etc but says needs direction/ help  Was on Abx which helped his legs  Very challenging discussion, lots of questions, 45 mins  Though I asked many times and in different ways- the importance of follow up with your PCP as they really need to check if your infection resolved they did not tell me they refused appt with Moreno Regan   I called her myself after this telephone encounter and she filled me   My nurse had already faxed the home health papers to their requested agency- but if [facility] could please help/ take this over I would grateful. This patient has an in patient appt with our NP next month which his wife complains about- so if this continues to be a problem, we will have to reevaluate our patient- physician relationship. Review of Systems      Objective   Patient-Reported Vitals  Patient-Reported Systolic (Top): 069 mmHg  Patient-Reported Diastolic (Bottom): 89 mmHg  Patient-Reported Pulse: 113  Patient-Reported Weight: -- (does not have a scale at home)  Patient-Reported Height: 5'11  Patient-Reported Pulse Oximetry: 96         Total Time: minutes: 21-30 minutes     Anish Cid was evaluated through a synchronous (real-time) audio only encounter. Patient identification was verified at the start of the visit. He (or guardian if applicable) is aware that this is a billable service, which includes applicable co-pays.  This visit was conducted with patient's (and/or legal guardian's) verbal consent. He has not had a related appointment within my department in the past 7 days or scheduled within the next 24 hours. The patient was located at Home: [address] [facility] [id number]. The provider was located at [place] [id number]): [address],  [address] [place] [address].      Chris James, DO

## 2023-03-15 NOTE — PROGRESS NOTES
Anish Cid presents today for   Chief Complaint   Patient presents with    Follow-up     ADD ON - Pradeep Sabillon VISIT for [address] [facility] preferred language for health care discussion is english/other. Is someone accompanying this pt? VIRTUAL VISIT - Wife is helping    Is the patient using any DME equipment during OV? VIRTUAL VISIT    Depression Screening:  Depression: Not at risk    PHQ-2 Score: 0            Pt currently taking Anticoagulant therapy? no    Pt currently taking Antiplatelet therapy ? no      Coordination of Care:  1. Have you been to the ER, urgent care clinic since your last visit? Hospitalized since your last visit? yes    2. Have you seen or consulted any other health care providers outside of the [address] [place] [address] since your last visit? Include any pap smears or colon screening.  no

## 2023-03-25 ENCOUNTER — HOME HEALTH ADMISSION (OUTPATIENT)
Age: 83
End: 2023-03-25
Payer: MEDICARE

## 2023-03-27 ENCOUNTER — HOME CARE VISIT (OUTPATIENT)
Age: 83
End: 2023-03-27

## 2023-03-27 PROCEDURE — G0162 HHC RN E&M PLAN SVS, 15 MIN: HCPCS

## 2023-03-27 PROCEDURE — 0221000100 HH NO PAY CLAIM PROCEDURE

## 2023-03-28 VITALS
OXYGEN SATURATION: 97 % | HEART RATE: 100 BPM | RESPIRATION RATE: 18 BRPM | DIASTOLIC BLOOD PRESSURE: 82 MMHG | SYSTOLIC BLOOD PRESSURE: 120 MMHG | TEMPERATURE: 98.2 F

## 2023-03-28 ASSESSMENT — ENCOUNTER SYMPTOMS
DYSPNEA ACTIVITY LEVEL: AFTER AMBULATING 10 - 20 FT
PAIN LOCATION - PAIN QUALITY: SHOOTING

## 2023-03-28 NOTE — HOME HEALTH
Skilled services/Home bound verification:     Skilled Reason for admission/summary of clinical condition:  Patient went to dermatologist due to swelling, later then presented with blisters to left lower leg and was instructed to go to the ER where he was prescribed an antibiotic for leg that has completed. He then followed up with Dr. Babak Toure after completing antibiotics and then prescribed another round of antibitoics and home health to perform wound care on patient. Spoke with Dr. Babak Toure in reference to wound care to legs. MD made aware that patient has no desire to use compression as he had bad expierences with it. Patient educated on importance of compression. This patient is homebound for the following reasons Requires considerable and taxing effort to leave the home , Requires the assistance of 1 or more persons to leave the home  and Only leaves the home for medical reasons or Shinto services and are infrequent and of short duration for other reasons . Caregiver: spouse. Caregiver assists with Errands and medication managment. Medications reconciled and all medications are available in the home this visit. The following education was provided regarding medications: Take all medications as ordered   Medications  are effective at this time. High risk medication teaching regarding anticoagulants, hyperglycemic agents or opiod narcotics performed (specify) No major drug interactions noted with Lorrie muhammad MD notified of any discrepancies/look a like medications/medication interactions NONE. Home health supplies by type and quantity ordered/delivered this visit include: silver alginate, gauze, wound cleanser, abd pads, ace bandages, rolled gauze    Patient education provided this visit to include: Elevate legs as tolerated to help with swelling. Continue ambulation for at 30 mins a day. ACE bandages for compression to help relieve swelling.  Continue to finish antibiotics as

## 2023-03-30 ENCOUNTER — HOME CARE VISIT (OUTPATIENT)
Age: 83
End: 2023-03-30

## 2023-03-30 PROCEDURE — G0299 HHS/HOSPICE OF RN EA 15 MIN: HCPCS

## 2023-03-31 VITALS
TEMPERATURE: 97.8 F | OXYGEN SATURATION: 97 % | SYSTOLIC BLOOD PRESSURE: 110 MMHG | HEART RATE: 64 BPM | RESPIRATION RATE: 18 BRPM | DIASTOLIC BLOOD PRESSURE: 60 MMHG

## 2023-03-31 ASSESSMENT — ENCOUNTER SYMPTOMS: DYSPNEA ACTIVITY LEVEL: AFTER AMBULATING 10 - 20 FT

## 2023-03-31 NOTE — HOME HEALTH
Skilled reason for visit: Wound care to elft and right lower leg     Caregiver involvement: spouse available for errands and wound care as needed. Medications reviewed and all medications are available in the home this visit. The following education was provided regarding medications:  take all medications as prescribed . MD notified of any discrepancies/look a-like medications/medication interactions: none  Medications are effective at this time. Home health supplies by type and quantity ordered/delivered this visit include: none     Patient education provided this visit: Continue compliance with wearing compression to help with swelling. Elevation and ambulation as tolerated. Sharps education provided: none     Patient level of understanding of education provided: verbalizes understanding and patient able to show compliance with keeping ace bandages in place    Skilled Care Performed this visit: wound care     Patient response to procedure performed:  no complaints     Agency Progress toward goals: progressing - see intervention tab     Patient's Progress towards personal goals: progressing     Home exercise program: ambulation and elevation as tolerated     Continued need for the following skills: Nursing. Plan for next visit: wound care     Patient and/or caregiver notified and agrees to changes in the Plan of Care: N/A. The following discharge planning was discussed with the pt/caregiver: when all goals are met and wound is healed    UNABLE TO OBTAIN WOUND PICTURES FOR CHART DUE TO NO SIGNAL IN THE HOME.

## 2023-04-04 ENCOUNTER — HOME CARE VISIT (OUTPATIENT)
Age: 83
End: 2023-04-04

## 2023-04-04 PROCEDURE — G0299 HHS/HOSPICE OF RN EA 15 MIN: HCPCS

## 2023-04-05 VITALS
SYSTOLIC BLOOD PRESSURE: 128 MMHG | TEMPERATURE: 97.7 F | RESPIRATION RATE: 16 BRPM | DIASTOLIC BLOOD PRESSURE: 64 MMHG | OXYGEN SATURATION: 99 % | HEART RATE: 76 BPM

## 2023-04-05 ASSESSMENT — ENCOUNTER SYMPTOMS
STOOL DESCRIPTION: FORMED
PAIN LOCATION - PAIN QUALITY: SHARP

## 2023-04-08 ENCOUNTER — HOME CARE VISIT (OUTPATIENT)
Age: 83
End: 2023-04-08

## 2023-04-08 NOTE — CASE COMMUNICATION
[facility] Wife answered the phone and declined visit as patient was out having his haircut, and they are busy preparing for daughters wedding. Missed visit,  told.

## 2023-04-18 ENCOUNTER — HOME CARE VISIT (OUTPATIENT)
Age: 83
End: 2023-04-18
Payer: MEDICARE

## 2023-04-18 PROCEDURE — G0300 HHS/HOSPICE OF LPN EA 15 MIN: HCPCS

## 2023-04-20 ENCOUNTER — HOME CARE VISIT (OUTPATIENT)
Age: 83
End: 2023-04-20
Payer: MEDICARE

## 2023-04-20 PROCEDURE — G0300 HHS/HOSPICE OF LPN EA 15 MIN: HCPCS

## 2023-04-23 VITALS
OXYGEN SATURATION: 98 % | TEMPERATURE: 97.7 F | RESPIRATION RATE: 16 BRPM | HEART RATE: 71 BPM | HEART RATE: 63 BPM | RESPIRATION RATE: 16 BRPM | TEMPERATURE: 97.7 F | SYSTOLIC BLOOD PRESSURE: 122 MMHG | SYSTOLIC BLOOD PRESSURE: 132 MMHG | DIASTOLIC BLOOD PRESSURE: 78 MMHG | DIASTOLIC BLOOD PRESSURE: 67 MMHG | OXYGEN SATURATION: 99 %

## 2023-04-23 ASSESSMENT — ENCOUNTER SYMPTOMS
STOOL DESCRIPTION: FORMED
STOOL DESCRIPTION: FORMED

## 2023-04-25 ENCOUNTER — HOME CARE VISIT (OUTPATIENT)
Age: 83
End: 2023-04-25
Payer: MEDICARE

## 2023-04-25 PROCEDURE — G0300 HHS/HOSPICE OF LPN EA 15 MIN: HCPCS

## 2023-04-27 ENCOUNTER — HOME CARE VISIT (OUTPATIENT)
Age: 83
End: 2023-04-27
Payer: MEDICARE

## 2023-04-27 VITALS
DIASTOLIC BLOOD PRESSURE: 63 MMHG | HEART RATE: 58 BPM | OXYGEN SATURATION: 95 % | RESPIRATION RATE: 16 BRPM | SYSTOLIC BLOOD PRESSURE: 127 MMHG | TEMPERATURE: 97.7 F

## 2023-04-27 PROCEDURE — G0300 HHS/HOSPICE OF LPN EA 15 MIN: HCPCS

## 2023-04-27 ASSESSMENT — ENCOUNTER SYMPTOMS: STOOL DESCRIPTION: FORMED

## 2023-04-27 NOTE — HOME HEALTH
SKILLED REASON for visit:wound care CAREGIVER INVOLVEMENT:wife is available as needed for assistance with iadls, adls, meal prep, medication management, taking to md appointments. MEDICATIONS: reviewed and all medications are available in the home this visit. Patient denies any medication changes at this time of assessment. EDUCATION PROVIDED: regarding medications, medication interactions, and look alike medications (specify): reviewed side effects, purposes, dosage, frequencies. High risk medication teaching regarding anticoagulants, hyperglycemic agents or opiod narcotics performed (specify) na Medications are effective at this time. SUPPLIES: by type and quantity ordered/delivered this visit include: n/a PATIENT EDUCATION ON THIS VISIT: wound care to lower extremities based on wound care orders. PATIENT LEVEL OF UNDERSTANDING: patient/cg has a partial understanding of education that was provided at this time by engaging in all education provided and is able to verbalize understanding, pt denies any questions or concerns at this time. SKILLED CARE PERFORMED THIS VISIT-SN instructed patient that diabetes may lead to skin problems that range from itching to infections that are hard to control. To reduce your chances for getting skin problems, take good care of your skin every day: Bathe daily with mild soap and lukewarm water. Apply a small amount of moisturizing lotion while your skin is moist. Avoid scratches, punctures, and other injuries. PATIENT RESPONSE TO PROCEDURE PERFORMED: patient tolerated procedure with no signs of discomfort, grimacing or pain, no complications or concerns noted. Agency Progress toward goals: goals/teaching reviewed. patient is progressing towards goals at this time, Patient's Progress towards personal goals: pt reporting they are progressing towards their goals at this time.  Home exercise program: HEP deep breathing exercises Continued need for the following skills: Nursing Plan for

## 2023-04-30 VITALS
SYSTOLIC BLOOD PRESSURE: 130 MMHG | OXYGEN SATURATION: 98 % | TEMPERATURE: 97.7 F | RESPIRATION RATE: 16 BRPM | DIASTOLIC BLOOD PRESSURE: 67 MMHG | HEART RATE: 66 BPM

## 2023-04-30 ASSESSMENT — ENCOUNTER SYMPTOMS: STOOL DESCRIPTION: FORMED

## 2023-05-02 ENCOUNTER — HOME CARE VISIT (OUTPATIENT)
Age: 83
End: 2023-05-02
Payer: MEDICARE

## 2023-05-02 PROCEDURE — G0300 HHS/HOSPICE OF LPN EA 15 MIN: HCPCS

## 2023-05-04 ENCOUNTER — HOME CARE VISIT (OUTPATIENT)
Age: 83
End: 2023-05-04
Payer: MEDICARE

## 2023-05-04 VITALS
TEMPERATURE: 98 F | DIASTOLIC BLOOD PRESSURE: 77 MMHG | HEART RATE: 102 BPM | OXYGEN SATURATION: 96 % | SYSTOLIC BLOOD PRESSURE: 152 MMHG | RESPIRATION RATE: 16 BRPM

## 2023-05-04 ASSESSMENT — ENCOUNTER SYMPTOMS: STOOL DESCRIPTION: FORMED

## 2023-05-05 NOTE — HOME HEALTH
SKILLED REASON for visit:wound care CAREGIVER INVOLVEMENT:wife is available as needed for assistance with iadls, adls, meal prep, medication management, taking to md appointments. MEDICATIONS: reviewed and all medications are available in the home this visit. Patient denies any medication changes at this time of assessment. EDUCATION PROVIDED: regarding medications, medication interactions, and look alike medications (specify): reviewed side effects, purposes, dosage, frequencies. High risk medication teaching regarding anticoagulants, hyperglycemic agents or opiod narcotics performed (specify) na Medications are effective at this time. SUPPLIES: by type and quantity ordered/delivered this visit include: n/a     PATIENT EDUCATION ON THIS VISIT: wound care, Patient was instructed on leg edema. Swollen feet and legs, referred to medically as leg edema, occur when fluid is retained in the spaces between body cells. As leg edema typically affects the feet, ankles and lower legs, but can also impact any areas of the body, causing systemic symptoms. Discussed discharge with Pt. and wife. PATIENT LEVEL OF UNDERSTANDING: patient/cg has a partial understanding of education that was provided at this time by engaging in all education provided and is able to verbalize understanding, pt denies any questions or concerns at this time. SKILLED CARE PERFORMED THIS VISIT-SN  Avoid scratches, punctures, and other injuries. Elevate as needed to offset edema. PATIENT RESPONSE TO PROCEDURE PERFORMED: patient tolerated procedure with no signs of discomfort, grimacing or pain, no complications or concerns noted. Agency Progress toward goals: goals/teaching reviewed. patient is progressing towards goals at this time, Patient's Progress towards personal goals: pt reporting they are progressing towards their goals at this time.  Home exercise program: HEP deep breathing exercises Continued need for the following skills: Nursing

## 2023-05-19 ENCOUNTER — HOME CARE VISIT (OUTPATIENT)
Age: 83
End: 2023-05-19
Payer: MEDICARE

## 2023-05-25 ENCOUNTER — HOME CARE VISIT (OUTPATIENT)
Age: 83
End: 2023-05-25
Payer: MEDICARE

## 2023-05-25 VITALS
SYSTOLIC BLOOD PRESSURE: 141 MMHG | TEMPERATURE: 98.2 F | DIASTOLIC BLOOD PRESSURE: 98 MMHG | OXYGEN SATURATION: 97 % | RESPIRATION RATE: 16 BRPM | HEART RATE: 116 BPM

## 2023-05-25 PROCEDURE — G0299 HHS/HOSPICE OF RN EA 15 MIN: HCPCS

## 2023-05-25 ASSESSMENT — ENCOUNTER SYMPTOMS: STOOL DESCRIPTION: FORMED

## 2023-05-25 NOTE — HOME HEALTH
Patient admitted to home health services for chronic ulcer left lower extremity and diabetes. Throughout episode patient and caregiver educated on diabetic management, med management, and were provided wound care services. Patient's wounds have healed and he is no longer homebound, due to this patient is being discharged from all home health services. Discharge instructions provided. SN instructed patient to follow up with MD as ordered. If you have to miss your appointments reschedule them. SN instructed patient on importance of compliance with medication regimen, try to avoid missing doses, if you do miss a dose do not double up but instead take at your next scheduled time. SN instructed on importance of infection control and handwashing. SN instructed on when to call the Dr.: temp >100.4, any pain not relieved with medications, any slight shortness of breath, chest discomfort, falls, or questions and concerns. SN instructed to continue cardiac and diabetic diet. SN instructed to continue exercises as prescribed by therapy but don't over do it.

## 2023-06-30 ENCOUNTER — OFFICE VISIT (OUTPATIENT)
Age: 83
End: 2023-06-30
Payer: MEDICARE

## 2023-06-30 VITALS
DIASTOLIC BLOOD PRESSURE: 80 MMHG | BODY MASS INDEX: 29.54 KG/M2 | SYSTOLIC BLOOD PRESSURE: 136 MMHG | OXYGEN SATURATION: 97 % | HEART RATE: 111 BPM | HEIGHT: 71 IN | WEIGHT: 211 LBS

## 2023-06-30 DIAGNOSIS — R60.9 EDEMA, UNSPECIFIED TYPE: ICD-10-CM

## 2023-06-30 DIAGNOSIS — I47.1 SUPRAVENTRICULAR TACHYCARDIA (HCC): Primary | ICD-10-CM

## 2023-06-30 PROCEDURE — 1123F ACP DISCUSS/DSCN MKR DOCD: CPT | Performed by: INTERNAL MEDICINE

## 2023-06-30 PROCEDURE — 3079F DIAST BP 80-89 MM HG: CPT | Performed by: INTERNAL MEDICINE

## 2023-06-30 PROCEDURE — 1036F TOBACCO NON-USER: CPT | Performed by: INTERNAL MEDICINE

## 2023-06-30 PROCEDURE — G8427 DOCREV CUR MEDS BY ELIG CLIN: HCPCS | Performed by: INTERNAL MEDICINE

## 2023-06-30 PROCEDURE — 3075F SYST BP GE 130 - 139MM HG: CPT | Performed by: INTERNAL MEDICINE

## 2023-06-30 PROCEDURE — G8417 CALC BMI ABV UP PARAM F/U: HCPCS | Performed by: INTERNAL MEDICINE

## 2023-06-30 PROCEDURE — 99215 OFFICE O/P EST HI 40 MIN: CPT | Performed by: INTERNAL MEDICINE

## 2023-06-30 RX ORDER — FUROSEMIDE 20 MG/1
20 TABLET ORAL DAILY PRN
Qty: 60 TABLET | Refills: 0 | Status: SHIPPED | OUTPATIENT
Start: 2023-06-30

## 2023-06-30 ASSESSMENT — PATIENT HEALTH QUESTIONNAIRE - PHQ9
SUM OF ALL RESPONSES TO PHQ QUESTIONS 1-9: 0
1. LITTLE INTEREST OR PLEASURE IN DOING THINGS: 0
SUM OF ALL RESPONSES TO PHQ QUESTIONS 1-9: 0
2. FEELING DOWN, DEPRESSED OR HOPELESS: 0
SUM OF ALL RESPONSES TO PHQ9 QUESTIONS 1 & 2: 0

## 2023-08-28 ENCOUNTER — TELEPHONE (OUTPATIENT)
Age: 83
End: 2023-08-28

## 2023-08-28 NOTE — TELEPHONE ENCOUNTER
The pt, his wife and daughter have called several times this morning regarding a supposed referral that was for the pt to see Dr. Fabian Pond from [facility], NP. We have not received any referrals for this pt from any office. This pt already sees [facility] for cardiac care. It was very difficult trying to explain to everyone that he has a cardiologist already and would need to follow up with Dr. Velasquez. Pt explained he had abnormal HR and was concerned. I recommended that he go to the ER to be properly evaluated in a speedy time frame, but while trying to say this, he wouldn't let me speak much and was very upset.

## 2023-11-13 RX ORDER — FUROSEMIDE 20 MG/1
TABLET ORAL
Qty: 60 TABLET | Refills: 0 | OUTPATIENT
Start: 2023-11-13

## 2025-04-29 RX ORDER — METOPROLOL TARTRATE 25 MG/1
25 TABLET, FILM COATED ORAL 2 TIMES DAILY
Qty: 180 TABLET | OUTPATIENT
Start: 2025-04-29